# Patient Record
Sex: FEMALE | Race: WHITE | Employment: PART TIME | URBAN - METROPOLITAN AREA
[De-identification: names, ages, dates, MRNs, and addresses within clinical notes are randomized per-mention and may not be internally consistent; named-entity substitution may affect disease eponyms.]

---

## 2019-01-03 LAB
ANTIBODY SCREEN, EXTERNAL: NEGATIVE
CHLAMYDIA, EXTERNAL: NEGATIVE
GRBS, EXTERNAL: POSITIVE
HBSAG, EXTERNAL: NEGATIVE
HIV, EXTERNAL: NON REACTIVE
N. GONORRHEA, EXTERNAL: NEGATIVE
RUBELLA, EXTERNAL: NORMAL
T. PALLIDUM, EXTERNAL: NEGATIVE
TYPE, ABO & RH, EXTERNAL: NORMAL
URINALYSIS, EXTERNAL: NEGATIVE
URINALYSIS, EXTERNAL: NORMAL

## 2019-07-31 ENCOUNTER — ANESTHESIA (OUTPATIENT)
Dept: LABOR AND DELIVERY | Age: 34
End: 2019-07-31
Payer: COMMERCIAL

## 2019-07-31 ENCOUNTER — HOSPITAL ENCOUNTER (INPATIENT)
Age: 34
LOS: 2 days | Discharge: HOME | End: 2019-08-02
Attending: OBSTETRICS & GYNECOLOGY | Admitting: OBSTETRICS & GYNECOLOGY
Payer: COMMERCIAL

## 2019-07-31 ENCOUNTER — ANESTHESIA EVENT (OUTPATIENT)
Dept: LABOR AND DELIVERY | Age: 34
End: 2019-07-31
Payer: COMMERCIAL

## 2019-07-31 PROBLEM — Z37.9 NORMAL LABOR: Status: ACTIVE | Noted: 2019-07-31

## 2019-07-31 LAB
ERYTHROCYTE [DISTWIDTH] IN BLOOD BY AUTOMATED COUNT: 14.7 % (ref 11.5–14.5)
HCT VFR BLD AUTO: 32.7 % (ref 35–47)
HGB BLD-MCNC: 10.3 G/DL (ref 11.5–16)
MCH RBC QN AUTO: 27.5 PG (ref 26–34)
MCHC RBC AUTO-ENTMCNC: 31.5 G/DL (ref 30–36.5)
MCV RBC AUTO: 87.4 FL (ref 80–99)
NRBC # BLD: 0 K/UL (ref 0–0.01)
NRBC BLD-RTO: 0 PER 100 WBC
PLATELET # BLD AUTO: 467 K/UL (ref 150–400)
PMV BLD AUTO: 9.3 FL (ref 8.9–12.9)
RBC # BLD AUTO: 3.74 M/UL (ref 3.8–5.2)
WBC # BLD AUTO: 15.6 K/UL (ref 3.6–11)

## 2019-07-31 PROCEDURE — 74011000258 HC RX REV CODE- 258: Performed by: OBSTETRICS & GYNECOLOGY

## 2019-07-31 PROCEDURE — 0UQGXZZ REPAIR VAGINA, EXTERNAL APPROACH: ICD-10-PCS | Performed by: OBSTETRICS & GYNECOLOGY

## 2019-07-31 PROCEDURE — 65410000002 HC RM PRIVATE OB

## 2019-07-31 PROCEDURE — 74011000250 HC RX REV CODE- 250

## 2019-07-31 PROCEDURE — 74011250636 HC RX REV CODE- 250/636

## 2019-07-31 PROCEDURE — 77030031139 HC SUT VCRL2 J&J -A

## 2019-07-31 PROCEDURE — 88305 TISSUE EXAM BY PATHOLOGIST: CPT

## 2019-07-31 PROCEDURE — 74011250637 HC RX REV CODE- 250/637: Performed by: OBSTETRICS & GYNECOLOGY

## 2019-07-31 PROCEDURE — 74011250636 HC RX REV CODE- 250/636: Performed by: ANESTHESIOLOGY

## 2019-07-31 PROCEDURE — 77030034850

## 2019-07-31 PROCEDURE — 77030014125 HC TY EPDRL BBMI -B: Performed by: ANESTHESIOLOGY

## 2019-07-31 PROCEDURE — 59025 FETAL NON-STRESS TEST: CPT

## 2019-07-31 PROCEDURE — 75410000003 HC RECOV DEL/VAG/CSECN EA 0.5 HR

## 2019-07-31 PROCEDURE — A4300 CATH IMPL VASC ACCESS PORTAL: HCPCS

## 2019-07-31 PROCEDURE — 74011000250 HC RX REV CODE- 250: Performed by: ANESTHESIOLOGY

## 2019-07-31 PROCEDURE — 75410000002 HC LABOR FEE PER 1 HR

## 2019-07-31 PROCEDURE — 76060000078 HC EPIDURAL ANESTHESIA

## 2019-07-31 PROCEDURE — 36415 COLL VENOUS BLD VENIPUNCTURE: CPT

## 2019-07-31 PROCEDURE — 74011250636 HC RX REV CODE- 250/636: Performed by: OBSTETRICS & GYNECOLOGY

## 2019-07-31 PROCEDURE — 10907ZC DRAINAGE OF AMNIOTIC FLUID, THERAPEUTIC FROM PRODUCTS OF CONCEPTION, VIA NATURAL OR ARTIFICIAL OPENING: ICD-10-PCS | Performed by: OBSTETRICS & GYNECOLOGY

## 2019-07-31 PROCEDURE — 75410000000 HC DELIVERY VAGINAL/SINGLE

## 2019-07-31 PROCEDURE — 85027 COMPLETE CBC AUTOMATED: CPT

## 2019-07-31 RX ORDER — BUPIVACAINE HYDROCHLORIDE 2.5 MG/ML
INJECTION, SOLUTION EPIDURAL; INFILTRATION; INTRACAUDAL
Status: DISCONTINUED
Start: 2019-07-31 | End: 2019-07-31 | Stop reason: WASHOUT

## 2019-07-31 RX ORDER — OXYTOCIN/RINGER'S LACTATE 20/1000 ML
PLASTIC BAG, INJECTION (ML) INTRAVENOUS
Status: COMPLETED
Start: 2019-07-31 | End: 2019-07-31

## 2019-07-31 RX ORDER — FENTANYL CITRATE 50 UG/ML
INJECTION, SOLUTION INTRAMUSCULAR; INTRAVENOUS AS NEEDED
Status: DISCONTINUED | OUTPATIENT
Start: 2019-07-31 | End: 2019-07-31 | Stop reason: HOSPADM

## 2019-07-31 RX ORDER — EPHEDRINE SULFATE/0.9% NACL/PF 50 MG/5 ML
10 SYRINGE (ML) INTRAVENOUS
Status: ACTIVE | OUTPATIENT
Start: 2019-07-31 | End: 2019-07-31

## 2019-07-31 RX ORDER — FENTANYL CITRATE 50 UG/ML
100 INJECTION, SOLUTION INTRAMUSCULAR; INTRAVENOUS ONCE
Status: ACTIVE | OUTPATIENT
Start: 2019-07-31 | End: 2019-08-01

## 2019-07-31 RX ORDER — FACIAL-BODY WIPES
10 EACH TOPICAL DAILY PRN
Status: DISCONTINUED | OUTPATIENT
Start: 2019-07-31 | End: 2019-08-02 | Stop reason: HOSPADM

## 2019-07-31 RX ORDER — DIPHENHYDRAMINE HCL 25 MG
25 CAPSULE ORAL
Status: DISCONTINUED | OUTPATIENT
Start: 2019-07-31 | End: 2019-08-02 | Stop reason: HOSPADM

## 2019-07-31 RX ORDER — NALOXONE HYDROCHLORIDE 0.4 MG/ML
0.4 INJECTION, SOLUTION INTRAMUSCULAR; INTRAVENOUS; SUBCUTANEOUS AS NEEDED
Status: DISCONTINUED | OUTPATIENT
Start: 2019-07-31 | End: 2019-08-02 | Stop reason: HOSPADM

## 2019-07-31 RX ORDER — SODIUM CHLORIDE 0.9 % (FLUSH) 0.9 %
5-40 SYRINGE (ML) INJECTION AS NEEDED
Status: DISCONTINUED | OUTPATIENT
Start: 2019-07-31 | End: 2019-08-02 | Stop reason: HOSPADM

## 2019-07-31 RX ORDER — FENTANYL/BUPIVACAINE/NS/PF 2-1250MCG
PREFILLED PUMP RESERVOIR EPIDURAL
Status: COMPLETED
Start: 2019-07-31 | End: 2019-07-31

## 2019-07-31 RX ORDER — PENICILLIN G POTASSIUM 5000000 [IU]/1
INJECTION, POWDER, FOR SOLUTION INTRAMUSCULAR; INTRAVENOUS
Status: COMPLETED
Start: 2019-07-31 | End: 2019-07-31

## 2019-07-31 RX ORDER — SIMETHICONE 80 MG
80 TABLET,CHEWABLE ORAL
Status: DISCONTINUED | OUTPATIENT
Start: 2019-07-31 | End: 2019-08-02 | Stop reason: HOSPADM

## 2019-07-31 RX ORDER — BUPIVACAINE HYDROCHLORIDE AND EPINEPHRINE 2.5; 5 MG/ML; UG/ML
INJECTION, SOLUTION EPIDURAL; INFILTRATION; INTRACAUDAL; PERINEURAL AS NEEDED
Status: DISCONTINUED | OUTPATIENT
Start: 2019-07-31 | End: 2019-07-31 | Stop reason: HOSPADM

## 2019-07-31 RX ORDER — TRIPROLIDINE/PSEUDOEPHEDRINE 2.5MG-60MG
600 TABLET ORAL
Status: DISCONTINUED | OUTPATIENT
Start: 2019-07-31 | End: 2019-08-02 | Stop reason: HOSPADM

## 2019-07-31 RX ORDER — ONDANSETRON 4 MG/1
4 TABLET, FILM COATED ORAL
COMMUNITY
End: 2019-08-02

## 2019-07-31 RX ORDER — BUPIVACAINE HYDROCHLORIDE AND EPINEPHRINE 2.5; 5 MG/ML; UG/ML
INJECTION, SOLUTION EPIDURAL; INFILTRATION; INTRACAUDAL; PERINEURAL
Status: COMPLETED
Start: 2019-07-31 | End: 2019-07-31

## 2019-07-31 RX ORDER — MINERAL OIL
OIL (ML) MISCELLANEOUS AS NEEDED
Status: DISCONTINUED | OUTPATIENT
Start: 2019-07-31 | End: 2019-08-02 | Stop reason: HOSPADM

## 2019-07-31 RX ORDER — SODIUM CHLORIDE 0.9 % (FLUSH) 0.9 %
5-40 SYRINGE (ML) INJECTION EVERY 8 HOURS
Status: DISCONTINUED | OUTPATIENT
Start: 2019-07-31 | End: 2019-08-02 | Stop reason: HOSPADM

## 2019-07-31 RX ORDER — FENTANYL CITRATE 50 UG/ML
INJECTION, SOLUTION INTRAMUSCULAR; INTRAVENOUS
Status: COMPLETED
Start: 2019-07-31 | End: 2019-07-31

## 2019-07-31 RX ORDER — OXYTOCIN/RINGER'S LACTATE 20/1000 ML
125-500 PLASTIC BAG, INJECTION (ML) INTRAVENOUS ONCE
Status: COMPLETED | OUTPATIENT
Start: 2019-07-31 | End: 2019-07-31

## 2019-07-31 RX ORDER — FENTANYL/BUPIVACAINE/NS/PF 2-1250MCG
1-16 PREFILLED PUMP RESERVOIR EPIDURAL CONTINUOUS
Status: DISCONTINUED | OUTPATIENT
Start: 2019-07-31 | End: 2019-08-01 | Stop reason: HOSPADM

## 2019-07-31 RX ORDER — HYDROCORTISONE ACETATE PRAMOXINE HCL 2.5; 1 G/100G; G/100G
CREAM TOPICAL AS NEEDED
Status: DISCONTINUED | OUTPATIENT
Start: 2019-07-31 | End: 2019-08-02 | Stop reason: HOSPADM

## 2019-07-31 RX ORDER — SODIUM CHLORIDE, SODIUM LACTATE, POTASSIUM CHLORIDE, CALCIUM CHLORIDE 600; 310; 30; 20 MG/100ML; MG/100ML; MG/100ML; MG/100ML
125 INJECTION, SOLUTION INTRAVENOUS CONTINUOUS
Status: DISCONTINUED | OUTPATIENT
Start: 2019-07-31 | End: 2019-08-02 | Stop reason: HOSPADM

## 2019-07-31 RX ORDER — ONDANSETRON 4 MG/1
4 TABLET, ORALLY DISINTEGRATING ORAL
Status: ACTIVE | OUTPATIENT
Start: 2019-07-31 | End: 2019-08-01

## 2019-07-31 RX ADMIN — BUPIVACAINE HYDROCHLORIDE AND EPINEPHRINE 4 ML: 2.5; 5 INJECTION, SOLUTION EPIDURAL; INFILTRATION; INTRACAUDAL; PERINEURAL at 17:35

## 2019-07-31 RX ADMIN — SODIUM CHLORIDE, SODIUM LACTATE, POTASSIUM CHLORIDE, AND CALCIUM CHLORIDE 125 ML/HR: 600; 310; 30; 20 INJECTION, SOLUTION INTRAVENOUS at 16:31

## 2019-07-31 RX ADMIN — SODIUM CHLORIDE 5 MILLION UNITS: 900 INJECTION, SOLUTION INTRAVENOUS at 15:57

## 2019-07-31 RX ADMIN — SODIUM CHLORIDE, SODIUM LACTATE, POTASSIUM CHLORIDE, AND CALCIUM CHLORIDE 125 ML/HR: 600; 310; 30; 20 INJECTION, SOLUTION INTRAVENOUS at 15:30

## 2019-07-31 RX ADMIN — Medication 19980 MILLI-UNITS/HR: at 21:26

## 2019-07-31 RX ADMIN — FENTANYL CITRATE 100 MCG: 50 INJECTION, SOLUTION INTRAMUSCULAR; INTRAVENOUS at 17:35

## 2019-07-31 RX ADMIN — IBUPROFEN 600 MG: 100 SUSPENSION ORAL at 22:47

## 2019-07-31 RX ADMIN — BUPIVACAINE HYDROCHLORIDE AND EPINEPHRINE 2 ML: 2.5; 5 INJECTION, SOLUTION EPIDURAL; INFILTRATION; INTRACAUDAL; PERINEURAL at 17:33

## 2019-07-31 RX ADMIN — BUPIVACAINE HYDROCHLORIDE AND EPINEPHRINE 0.5 ML: 2.5; 5 INJECTION, SOLUTION EPIDURAL; INFILTRATION; INTRACAUDAL; PERINEURAL at 17:31

## 2019-07-31 RX ADMIN — Medication 12 ML/HR: at 17:55

## 2019-07-31 RX ADMIN — PENICILLIN G POTASSIUM 2.5 MILLION UNITS: 20000000 POWDER, FOR SOLUTION INTRAVENOUS at 19:30

## 2019-07-31 RX ADMIN — PENICILLIN G POTASSIUM 5 MILLION UNITS: 5000000 POWDER, FOR SOLUTION INTRAMUSCULAR; INTRAPLEURAL; INTRATHECAL; INTRAVENOUS at 16:00

## 2019-07-31 NOTE — ANESTHESIA PROCEDURE NOTES
CSE Block    Start time: 7/31/2019 5:17 PM  End time: 7/31/2019 5:37 PM  Performed by: Layla Stephens MD  Authorized by: Layla Stephens MD     Pre-Procedure  Indications: procedure for pain    preanesthetic checklist: patient identified, risks and benefits discussed, anesthesia consent, site marked, patient being monitored and timeout performed    Timeout Time: 17:16        Procedure:   Patient Position:  Seated  Prep Region:  Lumbar  Prep: chlorhexidine    Location:  L3-4    Epidural Needle:   Needle Type:  Tuohy  Needle Gauge:  18 G  Injection Technique:  Loss of resistance using air  Attempts:  2    Spinal Needle:   Needle Type:  Tuohy  Needle Gauge:  25 G    Catheter:   Catheter Type:  Flex-tip  Catheter Size:  19 G  Depth in Epidural Space (cm):  5  Events: no blood with aspiration, no cerebrospinal fluid with aspiration, no paresthesia, negative aspiration test and CSF confirmed    Test Dose:  Bupivacaine 0.25% w/ epi and negative    Assessment:   Catheter Secured:  Tegaderm and tape  Insertion:  Uncomplicated  Patient tolerance:  Patient tolerated the procedure well with no immediate complications

## 2019-07-31 NOTE — ANESTHESIA PREPROCEDURE EVALUATION
Anesthetic History   No history of anesthetic complications            Review of Systems / Medical History  Patient summary reviewed, nursing notes reviewed and pertinent labs reviewed    Pulmonary  Within defined limits                 Neuro/Psych   Within defined limits           Cardiovascular      Valvular problems/murmurs: mitral insufficiency            Exercise tolerance: >4 METS  Comments: MVP   GI/Hepatic/Renal  Within defined limits              Endo/Other  Within defined limits           Other Findings              Physical Exam    Airway  Mallampati: I  TM Distance: 4 - 6 cm  Neck ROM: normal range of motion   Mouth opening: Normal     Cardiovascular          Murmur: Grade 1, Mitral area     Dental         Pulmonary  Breath sounds clear to auscultation               Abdominal  GI exam deferred       Other Findings            Anesthetic Plan    ASA: 2  Anesthesia type: CSE            Anesthetic plan and risks discussed with: Patient

## 2019-07-31 NOTE — PROGRESS NOTES
7:15 PM  Bedside shift change report given to Vipin Steward RN (oncoming nurse) by Mica Ortega RN (offgoing nurse). Report included the following information SBAR, Kardex, Intake/Output, MAR and Recent Results. 8:12 PM  Dr Nikko Eldridge in with pt. Strip reviewed. SVE performed. Pt complete. Will prep for delivery. 8:21 PM   of live female infant. Nuchal x 1, reduced by Dr Nikko Eldridge.

## 2019-07-31 NOTE — ROUTINE PROCESS
1630- IVB for Epidural per request. 
1700- Anesthesia notified. 26- Dr Enriquez Favor in. Procedure explained to pt and consent verified. Pt verbalized understanding. Time out done. Positioned for procedure. 1725- Procedure completed pt tolerated. 1910- Bedside report to Ganesh Eubanks RN given. Care turned over at this time.

## 2019-07-31 NOTE — LACTATION NOTE
.Discussed with mother her plan for feeding. Reviewed the benefits of exclusive breast milk feeding during the hospital stay. Informed mother of the risks of using formula to supplement in the first few days of life as well as the benefits of successful breast milk feeding; referred mother to the handout in her admission packet related to these topics. Mother acknowledges understanding of information reviewed and states that it is her plan to breast milk and formula feed her infant. Will support her choice and offer additional information as needed.

## 2019-07-31 NOTE — H&P
History & Physical    Name: Ping Cuba MRN: [id number]  SSN: [id number]    YOB: 1985  Age: 26 y.o. Sex: female        Subjective:     Estimated Date of Delivery: 19  OB History    Para Term  AB Living   2 1 1     1   SAB TAB Ectopic Molar Multiple Live Births             1      # Outcome Date GA Lbr Addison/2nd Weight Sex Delivery Anes PTL Lv   2 Current            1 Term 14 37w5d  3.08 kg Ashleigh Levo       Ms. Kaylee Ventura is admitted with pregnancy at 38w1d for active labor. Prenatal course was normal. Please see prenatal records for details. Past Medical History:   Diagnosis Date    Heart abnormality     MVP     History reviewed. No pertinent surgical history. Social History     Occupational History    Not on file   Tobacco Use    Smoking status: Never Smoker    Smokeless tobacco: Never Used   Substance and Sexual Activity    Alcohol use: Not Currently    Drug use: Not on file    Sexual activity: Yes     Partners: Male     Birth control/protection: None     Family History   Problem Relation Age of Onset    Elevated Lipids Mother     Migraines Mother     Arthritis-osteo Father     Diabetes Father     Elevated Lipids Father     Migraines Father     Hypertension Father     Psychiatric Disorder Father     Migraines Sister      No Known Allergies  Prior to Admission medications    Medication Sig Start Date End Date Taking? Authorizing Provider   ondansetron hcl (ZOFRAN) 4 mg tablet Take 4 mg by mouth every eight (8) hours as needed for Nausea. Yes Provider, Historical        Review of Systems    Objective:     Vitals:  Vitals:    19 1609 19 1615 19 1634 19 1636   BP:    119/72   Pulse:    97   Resp:       Temp:       SpO2: 100%  100%    Weight:  69.4 kg (153 lb)     Height:  5' 5\" (1.651 m)          Physical Exam   Constitutional: She appears well-developed. HENT:   Head: Normocephalic.    Neck: Normal range of motion. Cardiovascular: Normal rate. Pulmonary/Chest: Effort normal.   Musculoskeletal: Normal range of motion. Neurological: She is alert. Cervical Exam: 4 cm dilated  Per Dr. Dee Coelho  Uterine Activity: Frequency: Every 4 minutes   Membranes: Intact  Fetal Heart Rate: Reactive     Prenatal Labs:   Lab Results   Component Value Date/Time    Rubella, External IMMUNE 5.52 01/03/2019    GrBStrep, External neg 04/17/2014    HBsAg, External NEGATIVE 01/03/2019    HIV, External NON REACTIVE 01/03/2019    RPR, External non reactive 10/11/2013    Gonorrhea, External NEGATIVE 01/03/2019    Chlamydia, External NEGATIVE 01/03/2019    ABO,Rh A NEGATIVE 01/03/2019          Impression/Plan:     Active Problems:    Normal labor (7/31/2019)         Plan: Admit for labor. Group B Strep was positive, will treat prophylactically with penicillin.   Will bolus for epidural.

## 2019-08-01 PROCEDURE — 74011250637 HC RX REV CODE- 250/637: Performed by: OBSTETRICS & GYNECOLOGY

## 2019-08-01 PROCEDURE — 86900 BLOOD TYPING SEROLOGIC ABO: CPT

## 2019-08-01 PROCEDURE — 74011250636 HC RX REV CODE- 250/636: Performed by: OBSTETRICS & GYNECOLOGY

## 2019-08-01 PROCEDURE — 65410000002 HC RM PRIVATE OB

## 2019-08-01 PROCEDURE — 36415 COLL VENOUS BLD VENIPUNCTURE: CPT

## 2019-08-01 PROCEDURE — 85461 HEMOGLOBIN FETAL: CPT

## 2019-08-01 RX ORDER — SERTRALINE HYDROCHLORIDE 50 MG/1
25 TABLET, FILM COATED ORAL DAILY
Status: DISCONTINUED | OUTPATIENT
Start: 2019-08-01 | End: 2019-08-02 | Stop reason: HOSPADM

## 2019-08-01 RX ADMIN — IBUPROFEN 600 MG: 100 SUSPENSION ORAL at 12:27

## 2019-08-01 RX ADMIN — IBUPROFEN 600 MG: 100 SUSPENSION ORAL at 21:11

## 2019-08-01 RX ADMIN — IBUPROFEN 600 MG: 100 SUSPENSION ORAL at 06:43

## 2019-08-01 RX ADMIN — ACETAMINOPHEN 650 MG: 160 SUSPENSION ORAL at 10:12

## 2019-08-01 RX ADMIN — HUMAN RHO(D) IMMUNE GLOBULIN 0.3 MG: 300 INJECTION, SOLUTION INTRAMUSCULAR at 21:15

## 2019-08-01 NOTE — PROGRESS NOTES
Report received from [address] [place] [address], care assumed. Pt has no needs at this time. 1000 mother concerned about her  not being on birth certificate, insurance etc due to her  not being babys father. Offered to place a case management consult to assist her in additional support if needed. Pt also desired to be back on zoloft as she was concerned about depression with all that is going on with marriage, fob not involved and other fidelia health issues. Will speak to Dr Gerri Max about starting it back up. Post partum depression signs discussed with patient and opportunity for questions given. [id number] report to [facility]  Discussed possibility of using different medication other than zoloft while breastfeeding. No lactation consultant here today to recommend.

## 2019-08-01 NOTE — PROGRESS NOTES
11:00 PM  Bedside shift change report given to Tabatha Hobson RN (oncoming nurse) by Xavier Araujo RN (offgoing nurse). Report included the following information SBAR, Kardex, Intake/Output, MAR and Recent Results.

## 2019-08-01 NOTE — PROGRESS NOTES
Post-Partum Day Number 1 Progress Note    Ivanna Bermudez     Assessment: Doing well, post partum day 1 from  p term labor. Complicated social situation and history of depression, desiring to see case management and restart home zoloft. Plan:  1. Continue routine postpartum and perineal care as well as maternal education. 2. Restart zoloft 25 mg. Will schedule 1 wk mood check. Case management to see patient. Information for the patient's :  Marlene Gomes [[id number]]   Vaginal, Spontaneous     Patient doing well without significant complaint. Voiding without difficulty, normal lochia. Ambulating, tolerating a diet. She is feeling stressed that her  can't be on the birth certificate (not FOB). Vitals:  Visit Vitals  /62 (BP 1 Location: Right arm, BP Patient Position: At rest)   Pulse 80   Temp 97.3 °F (36.3 °C)   Resp 17   Ht 5' 5\" (1.651 m)   Wt 69.4 kg (153 lb)   SpO2 97%   Breastfeeding? Unknown   BMI 25.46 kg/m²     Temp (24hrs), Av °F (36.7 °C), Min:97.3 °F (36.3 °C), Max:98.4 °F (36.9 °C)        Exam:   Patient without distress. Abdomen soft, fundus firm, nontender                Perineum with normal lochia noted. Lower extremities are negative for swelling, cords or tenderness.     Labs:       Recent Results (from the past 24 hour(s))   CBC W/O DIFF    Collection Time: 19  3:30 PM   Result Value Ref Range    WBC 15.6 (H) 3.6 - 11.0 K/uL    RBC 3.74 (L) 3.80 - 5.20 M/uL    HGB 10.3 (L) 11.5 - 16.0 g/dL    HCT 32.7 (L) 35.0 - 47.0 %    MCV 87.4 80.0 - 99.0 FL    MCH 27.5 26.0 - 34.0 PG    MCHC 31.5 30.0 - 36.5 g/dL    RDW 14.7 (H) 11.5 - 14.5 %    PLATELET 754 (H) 045 - 400 K/uL    MPV 9.3 8.9 - 12.9 FL    NRBC 0.0 0  WBC    ABSOLUTE NRBC 0.00 0.00 - 0.01 K/uL   RH IMMUNE GLOBULIN EVAL-LAB ORDER    Collection Time: 19  3:31 AM   Result Value Ref Range    ABO/Rh(D) A NEGATIVE     Fetal screen NEG     WEAK D NEG Unit number [id number]     Blood component type RH IMMUNE GLOBULIN     Unit division 00     Status of unit ALLOCATED

## 2019-08-01 NOTE — L&D DELIVERY NOTE
Delivery Summary  Patient: Bashir Recio             Circumcision:   NA-female  Additional Delivery Comments - Pt presented in active labor. PCN given for gbs prophylaxis. After second dose, arom performed and quickly progressed to c/c/+3. Pushed to deliver a viable female infant over intact perineum. Cord was clamped and cut and baby handed to nursing per mother's request.  Placenta delivered spontaneously and intact and was normal in appearance. Vaginal laceration just inside introitus to the right was reapproximated with 3-0 vicryl. An irregular mole was noted on her left buttock and she stated it catches on her underwear so it was removed sharply and the defect closed with one interrupted suture of 3-0 vicryl. It was sent to pathology. Information for the patient's :  Lachelle Tyson [[id number]]       Labor Events:    Labor: No    Steroids: None   Cervical Ripening Date/Time:       Cervical Ripening Type: None   Antibiotics During Labor: Yes   Rupture Identifier:      Rupture Date/Time: 2019 8:00 PM   Rupture Type: AROM   Amniotic Fluid Volume:      Amniotic Fluid Description: Clear    Amniotic Fluid Odor: None    Induction:         Induction Date/Time:        Indications for Induction:      Augmentation:     Augmentation Date/Time:      Indications for Augmentation:     Labor complications:          Additional complications:        Delivery Events:  Indications For Episiotomy:     Episiotomy:     Perineal Laceration(s):     Repaired:     Periurethral Laceration Location:      Repaired:     Labial Laceration Location:     Repaired:     Sulcal Laceration Location:     Repaired:     Vaginal Laceration Location:     Repaired:     Cervical Laceration Location:     Repaired:     Repair Suture:     Number of Repair Packets:     Estimated Blood Loss (ml):  ml     Delivery Date:     Delivery Time:   Delivery Type: Vaginal, Spontaneous  Sex:      Gestational Age: 43w4d Delivery Clinician:  Hugo Ugalde  Living Status: Living   Delivery Location: L&D 3168          APGARS  One minute Five minutes Ten minutes   Skin color: 0   1        Heart rate: 2   2        Grimace: 2   2        Muscle tone: 2   2        Breathin   2        Totals: 8   9            Presentation: Vertex    Position:        Resuscitation Method:        Meconium Stained:        Cord Information:    Complications: Nuchal Cord Without Compressions  Cord around:    Delayed cord clamping? Cord clamped date/time:   Disposition of Cord Blood:      Blood Gases Sent?:      Placenta:  Date/Time: 2019  8:26 PM  Removal: Spontaneous      Appearance: Intact     [place] Measurements:  Birth Weight:        Birth Length:        Head Circumference:        Chest Circumference:       Abdominal Girth:       Other Providers:   LASHAWN UGALDE, Obstetrician;Primary Nurse;Primary  Nurse;Nicu Nurse;Neonatologist;Anesthesiologist;Crna;Nurse Practitioner;Midwife;Nursery Nurse           Cord pH:  none    Episiotomy:     Laceration(s):       Estimated Blood Loss (ml):     Labor Events  Method:        Augmentation:     Cervical Ripening:       None        Hospital Problems  Never Reviewed          Codes Class Noted POA    Normal labor ICD-10-CM: [de-identified], Z37.9  ICD-9-CM: 634  2019 Unknown              Operative Vaginal Delivery - none    Group B Strep:   Lab Results   Component Value Date/Time    GrBStrep, External Positive 2019     Information for the patient's :  Sadafdallas hSanksard [[id number]]   No results found for: ABORH, PCTABR, PCTDIG, BILI, ABORHEXT, ABORH    No results found for: APH, APCO2, APO2, AHCO3, ABEC, ABDC, O2ST, EPHV, PCO2V, PO2V, HCO3V, EBEV, EBDV, SITE, RSCOM

## 2019-08-02 VITALS
BODY MASS INDEX: 25.49 KG/M2 | HEIGHT: 65 IN | HEART RATE: 59 BPM | OXYGEN SATURATION: 97 % | SYSTOLIC BLOOD PRESSURE: 115 MMHG | WEIGHT: 153 LBS | TEMPERATURE: 98 F | DIASTOLIC BLOOD PRESSURE: 70 MMHG | RESPIRATION RATE: 16 BRPM

## 2019-08-02 LAB
ABO + RH BLD: NORMAL
BLD PROD TYP BPU: NORMAL
BPU ID: NORMAL
FETAL SCREEN,FMHS: NORMAL
STATUS OF UNIT,%ST: NORMAL
UNIT DIVISION, %UDIV: 0
WEAK D AG RBC QL: NORMAL

## 2019-08-02 PROCEDURE — 74011250637 HC RX REV CODE- 250/637: Performed by: OBSTETRICS & GYNECOLOGY

## 2019-08-02 RX ORDER — TRIPROLIDINE/PSEUDOEPHEDRINE 2.5MG-60MG
600 TABLET ORAL
Qty: 473 ML | Refills: 1 | Status: SHIPPED | OUTPATIENT
Start: 2019-08-02

## 2019-08-02 RX ORDER — SERTRALINE HYDROCHLORIDE 25 MG/1
25 TABLET, FILM COATED ORAL DAILY
Qty: 30 TAB | Refills: 2 | Status: SHIPPED | OUTPATIENT
Start: 2019-08-03

## 2019-08-02 RX ADMIN — IBUPROFEN 600 MG: 100 SUSPENSION ORAL at 05:40

## 2019-08-02 NOTE — PROGRESS NOTES
Bedside and Verbal shift change report given to A. Meryle Simpson, RN  (oncoming nurse) by MAKENZIE Rabago (offgoing nurse). Report included the following information SBAR, Kardex, Procedure Summary, Intake/Output, MAR and Recent Results.

## 2019-08-02 NOTE — ROUTINE PROCESS
2300 Bedside shift change report given to MAKENZIE Hardy RN (oncoming nurse) by Shay Quigley. Hattie Wood [facility] (offgoing nurse). Report included the following information SBAR, Kardex, Intake/Output and MAR.

## 2019-08-02 NOTE — PROGRESS NOTES
Patient discharge prescriptions & instructions given. Verbalized understanding. All questions answered. No distress noted. Signed copy of discharge instructions on paper chart. Pt instructed to follow up with OB in 1 week for postpartum follow up appointment, sooner if needed.

## 2019-08-02 NOTE — DISCHARGE SUMMARY
Obstetrical Discharge Summary     Name: Matt Mitchell MRN: [id number]  SSN: [id number]    YOB: 1985  Age: 26 y.o. Sex: female      Admit Date: 2019    Discharge Date: 2019     Admitting Physician: Tom Kleley MD     Attending Physician:  Lenora Meckel, MD     Admission Diagnoses: Normal labor [O80, Z37.9]    Discharge Diagnoses:   Information for the patient's :  Sallie Mckenzie [[id number]]   Delivery of a 3.195 kg female infant via Vaginal, Spontaneous on 2019 at 8:21 PM  by Tom Kelley. Apgars were 8  and 9 . Additional Diagnoses:   Hospital Problems  Never Reviewed          Codes Class Noted POA    Normal labor ICD-10-CM: [de-identified], Z37.9  ICD-9-CM: 159  2019 Unknown             Lab Results   Component Value Date/Time    Rubella, External IMMUNE 5.52 2019    GrBStrep, External Positive 2019       Hospital Course: Normal hospital course following the delivery. Disposition at Discharge: Home or self care    Discharged Condition: Stable    Patient Instructions:   Current Discharge Medication List      START taking these medications    Details   ibuprofen (ADVIL;MOTRIN) 100 mg/5 mL suspension Take 30 mL by mouth every six (6) hours as needed (pain). Indications: pain  Qty: 473 mL, Refills: 1      sertraline (ZOLOFT) 25 mg tablet Take 1 Tab by mouth daily. Qty: 30 Tab, Refills: 2         STOP taking these medications       ondansetron hcl (ZOFRAN) 4 mg tablet Comments:   Reason for Stopping:               Reference my discharge instructions. Follow-up Appointments   Procedures    FOLLOW UP VISIT Appointment in: One Week     Standing Status:   Standing     Number of Occurrences:   1     Order Specific Question:   Appointment in     Answer: One Week    FOLLOW UP VISIT Appointment in: 6 Weeks Post partum follow up with OB provider in 6 weeks. Post partum follow up with OB provider in 6 weeks.      Standing Status:   Standing Number of Occurrences:   1     Standing Expiration Date:   8/3/2019     Order Specific Question:   Appointment in     Answer:   6 Weeks        Signed By:  Feliciano Avalos MD     August 2, 2019

## 2019-08-02 NOTE — DISCHARGE INSTRUCTIONS
Vaginal Childbirth: What To Expect At Home    Your Recovery: Your body will slowly heal in the next few weeks. It is easy to get too tired and overwhelmed during the first weeks after your baby is born. Changes in your hormones can shift your mood without warning. You may find it hard to meet the extra demands on your energy and time. Take it easy on yourself. Follow-up care is a key part of your treatment and safety. Be sure to make and go to all appointments, and call your doctor if you are having problems. It's also a good idea to know your test results and keep a list of the medicines you take. How can you care for yourself at home? Vaginal bleeding and cramps  · After delivery, you will have a bloody discharge from the vagina. This will turn pink within a week and then white or yellow after about 10 days. It may last for 2 to 4 weeks or longer, until the uterus has healed. Use pads instead of tampons until you stop bleeding. · Do not worry if you pass some blood clots, as long as they are smaller than a golf ball. If you have a tear or stitches in your vaginal area, change the pad at least every 4 hours to prevent soreness and infection. · You may have cramps for the first few days after childbirth. These are normal and occur as the uterus shrinks to normal size. Take an over-the-counter pain medicine, such as acetaminophen (Tylenol), ibuprofen (Advil, Motrin), or naproxen (Aleve), for cramps. Read and follow all instructions on the label. Do not take aspirin, because it can cause more bleeding. Do not take acetaminophen (Tylenol) and other acetaminophen containing medications (i.e. Percocet) at the same time. Breast fullness  · Your breasts may overfill (engorge) in the first few days after delivery. To help milk flow and to relieve pain, warm your breasts in the shower or by using warm, moist towels before nursing.   · If you are not nursing, do not put warmth on your breasts or touch your breasts. Wear a tight bra or sports bra and use ice until the fullness goes away. This usually takes 2 to 3 days. · Put ice or a cold pack on your breast after nursing to reduce swelling and pain. Put a thin cloth between the ice and your skin. Activity  · Eat a balanced diet. Do not try to lose weight by cutting calories. Keep taking your prenatal vitamins, or take a multivitamin. · Get as much rest as you can. Try to take naps when your baby sleeps during the day. · Get some exercise every day. But do not do any heavy exercise until your doctor says it is okay. · Wait until you are healed (about 4 to 6 weeks) before you have sexual intercourse. Your doctor will tell you when it is okay to have sex. · Talk to your doctor about birth control. You can get pregnant even before your period returns. Also, you can get pregnant while you are breast-feeding. Mental Health  · Many women get the \"baby blues\" during the first few days after childbirth. You may lose sleep, feel irritable, and cry easily. You may feel happy one minute and sad the next. Hormone changes are one cause of these emotional changes. Also, the demands of a new baby, along with visits from relatives or other family needs, add to a mother's stress. The \"baby blues\" often peak around the fourth day. Then they ease up in less than 2 weeks. · If your moodiness or anxiety lasts for more than 2 weeks, or if you feel like life is not worth living, you may have postpartum depression. This is different for each mother. Some mothers with serious depression may worry intensely about their infant's well-being. Others may feel distant from their child. Some mothers might even feel that they might harm their baby. A mother may have signs of paranoia, wondering if someone is watching her. · With all the changes in your life, you may not know if you are depressed.  Pregnancy sometimes causes changes in how you feel that are similar to the symptoms of depression. · Symptoms of depression include:  · Feeling sad or hopeless and losing interest in daily activities. These are the most common symptoms of depression. · Sleeping too much or not enough. · Feeling tired. You may feel as if you have no energy. · Eating too much or too little. · [employer] ([employer]) offers a Warm line; Chat with the Expert phone sessions; Information and Articles about Pregnancy and Postpartum Mood Disorders; Comprehensive List of Free Support Groups; Knowledgeable local coordinators who will offer support, information, and resources; Guide to Resources on [place]; Calendar of events in the  mood disorders community; Latest News and Research; and [facility] [address] for [employer]. Remember - You are not alone; You are not to blame; With help, you will be well. 5-161-312-PPD(8773). WWW. POSTPARTUM. NET   · Writing or talking about death, such as writing suicide notes or talking about guns, knives, or pills. Keep the numbers for these national suicide hotlines: 6-027-585-TALK (3-349.984.2186) and 7-938-IXIHITH (0-900.546.6438). If you or someone you know talks about suicide or feeling hopeless, get help right away. Constipation and Hemorrhoids  · Drink plenty of fluids, enough so that your urine is light yellow or clear like water. If you have kidney, heart, or liver disease and have to limit fluids, talk with your doctor before you increase the amount of fluids you drink. · Eat plenty of fiber each day. Have a bran muffin or bran cereal for breakfast, and try eating a piece of fruit for a mid-afternoon snack. · For painful, itchy hemorrhoids, put ice or a cold pack on the area several times a day for 10 minutes at a time. Follow this by putting a warm compress on the area for another 10 to 20 minutes or by sitting in a shallow, warm bath. When should you call for help? Call 911 anytime you think you may need emergency care.  For example, call if:  · You are thinking of hurting yourself, your baby, or anyone else. · You passed out (lost consciousness). · You have symptoms of a blood clot in your lung (called a pulmonary embolism). These may include:    · Sudden chest pain. · Trouble breathing. · Coughing up blood. Call your doctor now or seek immediate medical care if:  · You have severe vaginal bleeding. · You are soaking through a pad each hour for 2 or more hours. · Your vaginal bleeding seems to be getting heavier or is still bright red 4 days after delivery. · You are dizzy or lightheaded, or you feel like you may faint. · You are vomiting or cannot keep fluids down. · You have a fever. · You have new or more belly pain. · You pass tissue (not just blood). · Your vaginal discharge smells bad. · Your belly feels tender or full and hard. · Your breasts are continuously painful or red. · You feel sad, anxious, or hopeless for more than a few days. · You have sudden, severe pain in your belly. · You have symptoms of a blood clot in your leg (called a deep vein thrombosis),          such as:  · Pain in your calf, back of the knee, thigh, or groin. · Redness and swelling in your leg or groin. · You have symptoms of preeclampsia, such as:  · Sudden swelling of your face, hands, or feet. · New vision problems (such as dimness or blurring). · A severe headache. · Your blood pressure is higher than it should be or rises suddenly. · You have new nausea or vomiting. Watch closely for changes in your health, and be sure to contact your doctor if you have any problems.

## 2019-08-02 NOTE — PROGRESS NOTES
Post-Partum Day Number 2 Progress Note    Charly Anand     Assessment: Doing well, post partum day 2. Restarted on Zoloft. Plan:   1. Discharge home today  2. Follow up in office in 1 week for mood check and in 6 weeks with Dr. Kassy Bourne (patient's request)  3. Post partum activity advised, diet as tolerated  4. Discharge Medications: ibuprofen, zoloft, and medications prior to admission    Information for the patient's :  Flores Choi [[id number]]   Vaginal, Spontaneous   Patient doing well without significant complaint. Voiding without difficulty, normal lochia. Questions regarding safety of Zoloft during breastfeeding reviewed. Vitals:  Visit Vitals  /70 (BP 1 Location: Left arm, BP Patient Position: Sitting)   Pulse (!) 59   Temp 98 °F (36.7 °C)   Resp 16   Ht 5' 5\" (1.651 m)   Wt 69.4 kg (153 lb)   SpO2 97%   Breastfeeding? Unknown   BMI 25.46 kg/m²     Temp (24hrs), Av °F (36.7 °C), Min:97.9 °F (36.6 °C), Max:98 °F (36.7 °C)      Exam:         Patient without distress. Abdomen soft, fundus firm, nontender                 Lower extremities are negative for swelling, cords or tenderness. Labs:     Lab Results   Component Value Date/Time    WBC 15.6 (H) 2019 03:30 PM    WBC 9.1 2015 10:03 PM    WBC 16.8 (H) 2014 06:25 PM    HGB 10.3 (L) 2019 03:30 PM    HGB 13.9 2015 10:03 PM    HGB 11.0 (L) 2014 06:25 PM    HCT 32.7 (L) 2019 03:30 PM    HCT 42.0 2015 10:03 PM    HCT 34.8 (L) 2014 06:25 PM    PLATELET 186 (H)  03:30 PM    PLATELET 694  10:03 PM    PLATELET 814 (H)  06:25 PM       No results found for this or any previous visit (from the past 24 hour(s)).

## 2019-08-02 NOTE — PROGRESS NOTES
CM noted consult indicating that mother needs assistance with plan for infant, her  is not father of infant, problems with insurance etc.    CM talked to RN and she was getting ready to DC pt. Not sure what assistance I could offer her other than contacting her local DSS if baby is in need of insurance. RN will let Pt know to contact DSS.      Valeriy [place]  Ext [phone]

## 2022-08-08 LAB
ANTIBODY SCREEN, EXTERNAL: NORMAL
HBSAG, EXTERNAL: NORMAL
HEPATITIS C AB,   EXT: NORMAL
HIV, EXTERNAL: NORMAL
RUBELLA, EXTERNAL: NORMAL
T. PALLIDUM, EXTERNAL: NORMAL
TYPE, ABO & RH, EXTERNAL: NORMAL

## 2022-09-14 LAB
CHLAMYDIA, EXTERNAL: NORMAL
N. GONORRHEA, EXTERNAL: NORMAL

## 2023-02-16 LAB — GRBS, EXTERNAL: NORMAL

## 2023-03-07 ENCOUNTER — ANESTHESIA (OUTPATIENT)
Dept: LABOR AND DELIVERY | Age: 38
End: 2023-03-07
Payer: COMMERCIAL

## 2023-03-07 ENCOUNTER — ANESTHESIA EVENT (OUTPATIENT)
Dept: LABOR AND DELIVERY | Age: 38
End: 2023-03-07
Payer: COMMERCIAL

## 2023-03-07 ENCOUNTER — HOSPITAL ENCOUNTER (INPATIENT)
Age: 38
LOS: 3 days | Discharge: HOME | End: 2023-03-10
Attending: OBSTETRICS & GYNECOLOGY | Admitting: OBSTETRICS & GYNECOLOGY
Payer: COMMERCIAL

## 2023-03-07 ENCOUNTER — HOSPITAL ENCOUNTER (EMERGENCY)
Age: 38
Discharge: HOME | End: 2023-03-07
Attending: OBSTETRICS & GYNECOLOGY | Admitting: OBSTETRICS & GYNECOLOGY
Payer: COMMERCIAL

## 2023-03-07 VITALS
TEMPERATURE: 98.2 F | HEIGHT: 65 IN | OXYGEN SATURATION: 100 % | SYSTOLIC BLOOD PRESSURE: 126 MMHG | HEART RATE: 108 BPM | BODY MASS INDEX: 26.66 KG/M2 | WEIGHT: 160 LBS | DIASTOLIC BLOOD PRESSURE: 82 MMHG | RESPIRATION RATE: 18 BRPM

## 2023-03-07 PROBLEM — O09.523 AMA (ADVANCED MATERNAL AGE) MULTIGRAVIDA 35+, THIRD TRIMESTER: Status: ACTIVE | Noted: 2023-03-07

## 2023-03-07 LAB
BASOPHILS # BLD: 0 K/UL (ref 0–0.1)
BASOPHILS NFR BLD: 0 % (ref 0–1)
DIFFERENTIAL METHOD BLD: ABNORMAL
EOSINOPHIL # BLD: 0 K/UL (ref 0–0.4)
EOSINOPHIL NFR BLD: 0 % (ref 0–7)
ERYTHROCYTE [DISTWIDTH] IN BLOOD BY AUTOMATED COUNT: 14.6 % (ref 11.5–14.5)
HCT VFR BLD AUTO: 36.4 % (ref 35–47)
HGB BLD-MCNC: 11.3 G/DL (ref 11.5–16)
IMM GRANULOCYTES # BLD AUTO: 0.1 K/UL (ref 0–0.04)
IMM GRANULOCYTES NFR BLD AUTO: 1 % (ref 0–0.5)
LYMPHOCYTES # BLD: 1.3 K/UL (ref 0.8–3.5)
LYMPHOCYTES NFR BLD: 8 % (ref 12–49)
MCH RBC QN AUTO: 28.3 PG (ref 26–34)
MCHC RBC AUTO-ENTMCNC: 31 G/DL (ref 30–36.5)
MCV RBC AUTO: 91 FL (ref 80–99)
MONOCYTES # BLD: 0.8 K/UL (ref 0–1)
MONOCYTES NFR BLD: 5 % (ref 5–13)
NEUTS SEG # BLD: 13.8 K/UL (ref 1.8–8)
NEUTS SEG NFR BLD: 86 % (ref 32–75)
NRBC # BLD: 0.03 K/UL (ref 0–0.01)
NRBC BLD-RTO: 0.2 PER 100 WBC
PLATELET # BLD AUTO: 478 K/UL (ref 150–400)
PMV BLD AUTO: 9.3 FL (ref 8.9–12.9)
RBC # BLD AUTO: 4 M/UL (ref 3.8–5.2)
WBC # BLD AUTO: 16 K/UL (ref 3.6–11)

## 2023-03-07 PROCEDURE — 59025 FETAL NON-STRESS TEST: CPT

## 2023-03-07 PROCEDURE — 80307 DRUG TEST PRSMV CHEM ANLYZR: CPT

## 2023-03-07 PROCEDURE — 74011000250 HC RX REV CODE- 250: Performed by: NURSE ANESTHETIST, CERTIFIED REGISTERED

## 2023-03-07 PROCEDURE — 77030014125 HC TY EPDRL BBMI -B: Performed by: ANESTHESIOLOGY

## 2023-03-07 PROCEDURE — 99283 EMERGENCY DEPT VISIT LOW MDM: CPT

## 2023-03-07 PROCEDURE — 86920 COMPATIBILITY TEST SPIN: CPT

## 2023-03-07 PROCEDURE — 65410000002 HC RM PRIVATE OB

## 2023-03-07 PROCEDURE — 36415 COLL VENOUS BLD VENIPUNCTURE: CPT

## 2023-03-07 PROCEDURE — 86921 COMPATIBILITY TEST INCUBATE: CPT

## 2023-03-07 PROCEDURE — 86644 CMV ANTIBODY: CPT

## 2023-03-07 PROCEDURE — 86900 BLOOD TYPING SEROLOGIC ABO: CPT

## 2023-03-07 PROCEDURE — 85025 COMPLETE CBC W/AUTO DIFF WBC: CPT

## 2023-03-07 PROCEDURE — 86870 RBC ANTIBODY IDENTIFICATION: CPT

## 2023-03-07 PROCEDURE — 86922 COMPATIBILITY TEST ANTIGLOB: CPT

## 2023-03-07 RX ORDER — OXYTOCIN/RINGER'S LACTATE 30/500 ML
87.3 PLASTIC BAG, INJECTION (ML) INTRAVENOUS AS NEEDED
Status: DISCONTINUED | OUTPATIENT
Start: 2023-03-07 | End: 2023-03-10 | Stop reason: HOSPADM

## 2023-03-07 RX ORDER — EPHEDRINE SULFATE/0.9% NACL/PF 50 MG/5 ML
10 SYRINGE (ML) INTRAVENOUS
Status: ACTIVE | OUTPATIENT
Start: 2023-03-07 | End: 2023-03-08

## 2023-03-07 RX ORDER — BUPIVACAINE HYDROCHLORIDE AND EPINEPHRINE 2.5; 5 MG/ML; UG/ML
INJECTION, SOLUTION EPIDURAL; INFILTRATION; INTRACAUDAL; PERINEURAL
Status: COMPLETED
Start: 2023-03-07 | End: 2023-03-07

## 2023-03-07 RX ORDER — SODIUM CHLORIDE 0.9 % (FLUSH) 0.9 %
5-40 SYRINGE (ML) INJECTION AS NEEDED
Status: DISCONTINUED | OUTPATIENT
Start: 2023-03-07 | End: 2023-03-08

## 2023-03-07 RX ORDER — NALOXONE HYDROCHLORIDE 0.4 MG/ML
0.4 INJECTION, SOLUTION INTRAMUSCULAR; INTRAVENOUS; SUBCUTANEOUS AS NEEDED
Status: DISCONTINUED | OUTPATIENT
Start: 2023-03-07 | End: 2023-03-10 | Stop reason: HOSPADM

## 2023-03-07 RX ORDER — ONDANSETRON 2 MG/ML
4 INJECTION INTRAMUSCULAR; INTRAVENOUS
Status: DISCONTINUED | OUTPATIENT
Start: 2023-03-07 | End: 2023-03-08

## 2023-03-07 RX ORDER — GUAIFENESIN 100 MG/5ML
81 LIQUID (ML) ORAL DAILY
COMMUNITY
End: 2023-03-10

## 2023-03-07 RX ORDER — SODIUM CHLORIDE 0.9 % (FLUSH) 0.9 %
5-40 SYRINGE (ML) INJECTION EVERY 8 HOURS
Status: DISCONTINUED | OUTPATIENT
Start: 2023-03-08 | End: 2023-03-08

## 2023-03-07 RX ORDER — FENTANYL/BUPIVACAINE/NS/PF 2-1250MCG
1-16 SYRINGE (ML) EPIDURAL CONTINUOUS
Status: DISCONTINUED | OUTPATIENT
Start: 2023-03-07 | End: 2023-03-10 | Stop reason: HOSPADM

## 2023-03-07 RX ORDER — NALOXONE HYDROCHLORIDE 0.4 MG/ML
0.4 INJECTION, SOLUTION INTRAMUSCULAR; INTRAVENOUS; SUBCUTANEOUS AS NEEDED
Status: DISCONTINUED | OUTPATIENT
Start: 2023-03-07 | End: 2023-03-08

## 2023-03-07 RX ORDER — PROMETHAZINE HYDROCHLORIDE 12.5 MG/1
TABLET ORAL
COMMUNITY

## 2023-03-07 RX ORDER — BUPIVACAINE HYDROCHLORIDE AND EPINEPHRINE 2.5; 5 MG/ML; UG/ML
INJECTION, SOLUTION EPIDURAL; INFILTRATION; INTRACAUDAL; PERINEURAL AS NEEDED
Status: DISCONTINUED | OUTPATIENT
Start: 2023-03-07 | End: 2023-03-08 | Stop reason: HOSPADM

## 2023-03-07 RX ORDER — LORATADINE 10 MG/1
10 TABLET ORAL
COMMUNITY

## 2023-03-07 RX ORDER — OXYTOCIN/RINGER'S LACTATE 30/500 ML
10 PLASTIC BAG, INJECTION (ML) INTRAVENOUS AS NEEDED
Status: DISCONTINUED | OUTPATIENT
Start: 2023-03-07 | End: 2023-03-10 | Stop reason: HOSPADM

## 2023-03-07 RX ORDER — SODIUM CHLORIDE, SODIUM LACTATE, POTASSIUM CHLORIDE, CALCIUM CHLORIDE 600; 310; 30; 20 MG/100ML; MG/100ML; MG/100ML; MG/100ML
125 INJECTION, SOLUTION INTRAVENOUS CONTINUOUS
Status: DISCONTINUED | OUTPATIENT
Start: 2023-03-08 | End: 2023-03-08

## 2023-03-07 RX ADMIN — BUPIVACAINE HYDROCHLORIDE AND EPINEPHRINE 7 ML: 2.5; 5 INJECTION, SOLUTION EPIDURAL; INFILTRATION; INTRACAUDAL; PERINEURAL at 23:17

## 2023-03-07 RX ADMIN — BUPIVACAINE HYDROCHLORIDE AND EPINEPHRINE 3 ML: 2.5; 5 INJECTION, SOLUTION EPIDURAL; INFILTRATION; INTRACAUDAL; PERINEURAL at 23:13

## 2023-03-07 RX ADMIN — Medication 12 ML/HR: at 23:21

## 2023-03-07 NOTE — PROGRESS NOTES
1610:  Jackie Sloan is a 42 y.o.   at 38w5d patient of  Dr Irish Jaramillo at [facility]  who presents to L&D triage with c/o contractions. She reports Positive FM, denies vaginal bleeding and LOF. She also denies Headaches, Scotoma, RUQ pain, and Edema. Urine sample obtained. EFM and toco placed for initial assessment. 1627: SVE performed by Nora Teague MD; 3/50/-3 ; strip reviewed at this time     1706: Patient education provided regarding discharge. Opportunity given for patient to ask questions all questions answered appropriately. Patient ambulated off unit. Patient remains remains pregnant and plans to deliver at [facility].

## 2023-03-07 NOTE — H&P
History & Physical    Name: Von Woods MRN: [id number]  SSN: [id number]    YOB: 1985  Age: 42 y.o. Sex: female        Subjective:     Estimated Date of Delivery: 3/16/23  OB History    Para Term  AB Living   4 2 2   1 2   SAB IAB Ectopic Molar Multiple Live Births   1       0 2      # Outcome Date GA Lbr Addison/2nd Weight Sex Delivery Anes PTL Lv   4 Current            3 Term 19 38w1d 27:12 / 00:09 3.195 kg F Vag-Spont CSE N PETAR   2 SAB            1 Term 14 37w5d  3.08 kg Elias Ramírez is a 42 y.o. G4 [phone] 96 @ 38w5d presenting for term rule out labor. Has been carolina this afternoon. Unsure of frequency but maybe every 10 minutes. No LOF. +FM. No VB. Prenatal course c/b:   - COVID - growth 69%ile @ 34wks, on ASA   - AMA - nml NIPT   - Rh negative     Past Medical History:   Diagnosis Date    Heart abnormality     MVP     No past surgical history on file. Social History     Occupational History    Not on file   Tobacco Use    Smoking status: Never    Smokeless tobacco: Never   Substance and Sexual Activity    Alcohol use: Not Currently    Drug use: Never    Sexual activity: Yes     Partners: Male     Birth control/protection: None     Family History   Problem Relation Age of Onset    Elevated Lipids Mother     Migraines Mother     OSTEOARTHRITIS Father     Diabetes Father     Elevated Lipids Father     Migraines Father     Hypertension Father     Psychiatric Disorder Father     Migraines Sister        No Known Allergies  Prior to Admission medications    Medication Sig Start Date End Date Taking? Authorizing Provider   PNV Comb #2-Iron-Omega 3-FA [id number] mg cmpk Take  by mouth. Yes Provider, Historical   loratadine (Claritin) 10 mg tablet Take 10 mg by mouth. Yes Provider, Historical   aspirin 81 mg chewable tablet Take 81 mg by mouth daily.    Yes Provider, Historical   promethazine (PHENERGAN) 12.5 mg tablet Take  by mouth every six (6) hours as needed for Nausea. Yes Provider, Historical   ibuprofen (ADVIL;MOTRIN) 100 mg/5 mL suspension Take 30 mL by mouth every six (6) hours as needed (pain). Indications: pain  Patient not taking: Reported on 3/7/2023 8/2/19   Chito Coburn MD   sertraline (ZOLOFT) 25 mg tablet Take 1 Tab by mouth daily. Patient not taking: Reported on 3/7/2023 8/3/19   Chito Coburn MD        Review of Systems: A comprehensive review of systems was negative except for that written in the HPI. Objective:     Vitals:  Vitals:    23 1611 23 1619   BP:  126/82   Pulse:  (!) 108   Resp:  18   Temp:  98.2 °F (36.8 °C)   SpO2:  100%   Weight: 72.6 kg (160 lb)    Height: 5' 5\" (1.651 m)         Physical Exam:  Patient without distress. Heart: well perfused  Lung: normal respiratory effort  Abdomen: soft, nontender  Cervical Exam: 3/50/-3  Membranes:  Intact  Fetal Heart Rate: 140s/mod lien/+accels/no decels     Prenatal Labs:   Lab Results   Component Value Date/Time    ABO/Rh(D) A NEGATIVE 2019 03:31 AM    Rubella, External IMMUNE 5.52 2019 12:00 AM    GrBStrep, External Positive 2019 12:00 AM    HBsAg, External NEGATIVE 2019 12:00 AM    HIV, External NON REACTIVE 2019 12:00 AM    RPR, External non reactive 10/11/2013 12:00 AM    Gonorrhea, External NEGATIVE 2019 12:00 AM    Chlamydia, External NEGATIVE 2019 12:00 AM    ABO,Rh A NEGATIVE 2019 12:00 AM         Assessment/Plan:     Active Problems:    * No active hospital problems. *     42yo  here for term rule out labor:    Plan:   - Cervix unchanged from office last week  - Category 1 tracing  - Normotensive    Reviewed labor precautions. Will discharge home with follow up later this week in office.      Edith Ureña MD  3/7/2023  4:43 PM

## 2023-03-08 LAB
AMPHET UR QL SCN: NEGATIVE
BARBITURATES UR QL SCN: NEGATIVE
BENZODIAZ UR QL: NEGATIVE
CANNABINOIDS UR QL SCN: NEGATIVE
COCAINE UR QL SCN: NEGATIVE
DRUG SCRN COMMENT,DRGCM: NORMAL
METHADONE UR QL: NEGATIVE
OPIATES UR QL: NEGATIVE
PCP UR QL: NEGATIVE

## 2023-03-08 PROCEDURE — 4A1HXCZ MONITORING OF PRODUCTS OF CONCEPTION, CARDIAC RATE, EXTERNAL APPROACH: ICD-10-PCS | Performed by: OBSTETRICS & GYNECOLOGY

## 2023-03-08 PROCEDURE — 00HU33Z INSERTION OF INFUSION DEVICE INTO SPINAL CANAL, PERCUTANEOUS APPROACH: ICD-10-PCS | Performed by: OBSTETRICS & GYNECOLOGY

## 2023-03-08 PROCEDURE — 75410000002 HC LABOR FEE PER 1 HR

## 2023-03-08 PROCEDURE — 76060000078 HC EPIDURAL ANESTHESIA

## 2023-03-08 PROCEDURE — 75410000000 HC DELIVERY VAGINAL/SINGLE

## 2023-03-08 PROCEDURE — 65410000002 HC RM PRIVATE OB

## 2023-03-08 PROCEDURE — 0HQ9XZZ REPAIR PERINEUM SKIN, EXTERNAL APPROACH: ICD-10-PCS | Performed by: OBSTETRICS & GYNECOLOGY

## 2023-03-08 PROCEDURE — 75410000003 HC RECOV DEL/VAG/CSECN EA 0.5 HR

## 2023-03-08 PROCEDURE — 74011250637 HC RX REV CODE- 250/637: Performed by: OBSTETRICS & GYNECOLOGY

## 2023-03-08 RX ORDER — OXYCODONE AND ACETAMINOPHEN 5; 325 MG/1; MG/1
1 TABLET ORAL
Status: ACTIVE | OUTPATIENT
Start: 2023-03-08 | End: 2023-03-09

## 2023-03-08 RX ORDER — IBUPROFEN 400 MG/1
800 TABLET ORAL EVERY 8 HOURS
Status: DISCONTINUED | OUTPATIENT
Start: 2023-03-08 | End: 2023-03-08

## 2023-03-08 RX ORDER — HYDROCORTISONE ACETATE PRAMOXINE HCL 2.5; 1 G/100G; G/100G
CREAM TOPICAL AS NEEDED
Status: DISCONTINUED | OUTPATIENT
Start: 2023-03-08 | End: 2023-03-10 | Stop reason: HOSPADM

## 2023-03-08 RX ORDER — NALOXONE HYDROCHLORIDE 0.4 MG/ML
0.4 INJECTION, SOLUTION INTRAMUSCULAR; INTRAVENOUS; SUBCUTANEOUS AS NEEDED
Status: DISCONTINUED | OUTPATIENT
Start: 2023-03-08 | End: 2023-03-10 | Stop reason: HOSPADM

## 2023-03-08 RX ORDER — FAMOTIDINE 20 MG/1
20 TABLET, FILM COATED ORAL 2 TIMES DAILY
Status: DISCONTINUED | OUTPATIENT
Start: 2023-03-08 | End: 2023-03-10 | Stop reason: HOSPADM

## 2023-03-08 RX ORDER — TRIPROLIDINE/PSEUDOEPHEDRINE 2.5MG-60MG
800 TABLET ORAL
Status: DISCONTINUED | OUTPATIENT
Start: 2023-03-08 | End: 2023-03-10 | Stop reason: HOSPADM

## 2023-03-08 RX ADMIN — IBUPROFEN 800 MG: 100 SUSPENSION ORAL at 18:41

## 2023-03-08 RX ADMIN — FAMOTIDINE 20 MG: 20 TABLET, FILM COATED ORAL at 08:50

## 2023-03-08 RX ADMIN — IBUPROFEN 800 MG: 100 SUSPENSION ORAL at 11:00

## 2023-03-08 RX ADMIN — FAMOTIDINE 20 MG: 20 TABLET, FILM COATED ORAL at 18:41

## 2023-03-08 NOTE — PROGRESS NOTES
Verbally spoke with OB Dr. Eddie Santacruz for patient's request for Liquid Motrin instead of  mg. Pt also requesting famotidine for gas pain/reflux - took 20 mg BID at home.      Orders placed by MD in patient's [facility]

## 2023-03-08 NOTE — LACTATION NOTE
This note was copied from a baby's chart. 23 1630   Visit Information   Lactation Consult Visit Type IP Initial Consult   Visit Length 15 minutes   Reason for Visit Normal [place] Visit;Education   Breast- Feeding Assessment   Breast-Feeding Experience Yes  Equipment: Has a breast pump; Not sure of brand   Breast Assessment   Breast Declined   Mother/Infant Observation   Infant Observation Frenulum checked  (Oral assessment WDL)     Reviewed the \"Your Guide to Breastfeeding\" booklet. Discussed the typical feeding characteristics in the 1st and 2nd DOL and signs of adequate intake. Baby just completed a feeing and mother states he is latching well. Discussed a feeding plan and mother's questions were addressed. Plan:  Offer lots of skin to skin and access to the breast.  Feed baby at early signs of hunger every 2-3 hours. Assure a deep latch, check that baby's lips are turned outward and use breast compression to keep baby actively feeding. Pump/hand express for poor feeds and offer baby EBM. Monitor wet and dirty diapers for signs of adequate intake. Follow instructions for managing engorgement.

## 2023-03-08 NOTE — PROGRESS NOTES
NST:    Baseline: 125    Variability: Moderate    Accelerations: Two 15 x 15 accels in a ten minute window    Decelerations: None    Contractions: Q 3 minutes    RNST    This test was preformed under my supervision and interpreted by me.     Levar Sanchez M.D.

## 2023-03-08 NOTE — ANESTHESIA PROCEDURE NOTES
Epidural Block    Patient location during procedure: OB  Reason for block: labor epidural  Staffing  Performed: CRNA   Preanesthetic Checklist  Completed: patient identified, IV checked, site marked, risks and benefits discussed, surgical consent, monitors and equipment checked, pre-op evaluation, timeout performed and fire risk safety assessment completed and verbalized  Block Placement  Patient position: sitting  Prep: DuraPrep  Sterility prep: mask, hand, gloves, drape and cap  Sedation level: no sedation  Patient monitoring: heart rate, continuous pulse oximetry and frequent blood pressure checks  Approach: midline  Location: lumbar  Lumbar location: L3-L4  Epidural  Loss of resistance technique: air  Guidance: landmark technique  Needle  Epidural needle type: Kenan Sánchez.    Needle gauge: 17 G  Needle insertion depth: 6.5 cm  Catheter size: 19 G  Catheter at skin depth: 10 cm  Catheter securement method: clear occlusive dressing and surgical tape  Test dose: negative  Assessment  Sensory level: T10  Block outcome: pain improved  Number of attempts: 1  Procedure assessment: patient tolerated procedure well with no immediate complications

## 2023-03-08 NOTE — L&D DELIVERY NOTE
Delivery Summary    Patient: Franc Jean MRN: [id number]  SSN: [id number]    YOB: 1985  Age: 42 y.o. Sex: female     Patient delivered a viable male infant via  over an intact perineum with no difficulty with delivery of shoulders requiring maternal expulsive efforts only. Small 1st degree laceration repaired with 2-0 chromic in the usual fashion. Introitus easily admitted two fingers after repair. No comps. Mother and infant doing well. Information for the patient's :  Esperanza Kumar [[id number]]     Labor Events:    Labor: No    Steroids: Full Course   Cervical Ripening Date/Time:       Cervical Ripening Type: None   Antibiotics During Labor: No   Rupture Identifier: Sac 1    Rupture Date/Time: 3/8/2023 2:00 AM   Rupture Type: SROM   Amniotic Fluid Volume:  Moderate    Amniotic Fluid Description: Clear    Amniotic Fluid Odor: None    Induction: None       Induction Date/Time:        Indications for Induction:      Augmentation: None   Augmentation Date/Time:      Indications for Augmentation:     Labor complications: None       Additional complications:        Delivery Events:  Indications For Episiotomy:     Episiotomy: None   Perineal Laceration(s): 1st   Repaired:     Periurethral Laceration Location:      Repaired:     Labial Laceration Location:     Repaired:     Sulcal Laceration Location:     Repaired:     Vaginal Laceration Location:     Repaired:     Cervical Laceration Location:     Repaired:     Repair Suture: Chromic 2-0   Number of Repair Packets: 1   Estimated Blood Loss (ml):  ml   Quantitative Blood Loss (ml)                Delivery Date: 3/8/2023    Delivery Time: 2:54 AM  Delivery Type: Vaginal, Spontaneous  Sex:  Male    Gestational Age: 38w7d   Delivery Clinician:  Sirisha Delarosa  Living Status: Living   Delivery Location: L&D            APGARS  One minute Five minutes Ten minutes   Skin color: 0   1        Heart rate: 2   2        Grimace: 2   2        Muscle tone: 2   2        Breathin   2        Totals: 8   9            Presentation: Vertex    Position:        Resuscitation Method:  None     Meconium Stained: None      Cord Information: 3 Vessels  Complications: None  Cord around:    Delayed cord clamping? Yes  Cord clamped date/time:   Disposition of Cord Blood: Lab    Blood Gases Sent?: No    Placenta:  Date/Time: 3/8/2023  2:59 AM  Removal: Spontaneous      Appearance: Intact     Tiff Measurements:  Birth Weight: 3.305 kg      Birth Length: 49.5 cm      Head Circumference: 33 cm      Chest Circumference: 33 cm     Abdominal Girth: 30.5 cm    Other Providers:   Anai HARRIS;STEVE LOCKHART;;;;;;;;Ethan CUEVAS Pac, Obstetrician;Primary Nurse;Primary  Nurse;Nicu Nurse;Neonatologist;Anesthesiologist;Crna;Nurse Practitioner;Midwife;Nursery Nurse;Charge Nurse;Scrub Tech;Staff Nurse         Group B Strep:   Lab Results   Component Value Date/Time    GrANTONtrezeke, External Neg 2023 12:00 AM     Information for the patient's :  Alejandrina Monroe [[id number]]   No results found for: ABORH, PCTABR, PCTDIG, BILI, ABORHEXT, ABORH   No results for input(s): PCO2CB, PO2CB, HCO3I, SO2I, IBD, PTEMPI, SPECTI, PHICB, ISITE, IDEV, IALLEN in the last 72 hours.

## 2023-03-08 NOTE — ROUTINE PROCESS
[address] [facility]. Coretta Olszewski, ENZO at bedside to perform straight cath due to pt needing to void and not able to feel legs fully    1055 - Pt ambulated to the bathroom x 2 assist with no complaints. Pt performed rodger and self care and voided. Pt ambulated x 1 standby assist back to bed with no complications.

## 2023-03-08 NOTE — PROGRESS NOTES
Bedside and Verbal shift change report given to zeke medina (oncoming nurse)  . Report included the following information SBAR.

## 2023-03-08 NOTE — H&P
History & Physical    Name: Sriram Swan MRN: [id number]  SSN: [id number]    YOB: 1985  Age: 42 y.o. Sex: female        Subjective:   CC: Contractions  Estimated Date of Delivery: 3/16/23  OB History    Para Term  AB Living   4 2 2   1 2   SAB IAB Ectopic Molar Multiple Live Births   1       0 2      # Outcome Date GA Lbr Addison/2nd Weight Sex Delivery Anes PTL Lv   4 Current            3 Term 19 38w1d 27:12 / 00:09 3.195 kg F Vag-Spont CSE N PETAR   2 SAB            1 Term 14 37w5d  3.08 kg Tomas Dance       Ms. Evelena Olszewski is admitted with pregnancy at 38w5d for active labor. Patient denies ROM,vaginal bleeding, or decreased FM. Prenatal course was complicated by  AMA and second trimester bleed . Please see 874/480 [facility] prenatal records from 23 through 3/2/23 for details. Past Medical History:   Diagnosis Date    Heart abnormality     MVP     History reviewed. No pertinent surgical history. Social History     Occupational History    Not on file   Tobacco Use    Smoking status: Never    Smokeless tobacco: Never   Substance and Sexual Activity    Alcohol use: Not Currently    Drug use: Never    Sexual activity: Yes     Partners: Male     Birth control/protection: None     Family History   Problem Relation Age of Onset    Elevated Lipids Mother     Migraines Mother     OSTEOARTHRITIS Father     Diabetes Father     Elevated Lipids Father     Migraines Father     Hypertension Father     Psychiatric Disorder Father     Migraines Sister      No Known Allergies  Prior to Admission medications    Medication Sig Start Date End Date Taking? Authorizing Provider   PNV Comb #2-Iron-Omega 3-FA [id number] mg cmpk Take  by mouth. Provider, Historical   loratadine (Claritin) 10 mg tablet Take 10 mg by mouth. Provider, Historical   aspirin 81 mg chewable tablet Take 81 mg by mouth daily.     Provider, Historical   promethazine (PHENERGAN) 12.5 mg tablet Take by mouth every six (6) hours as needed for Nausea. Provider, Historical   ibuprofen (ADVIL;MOTRIN) 100 mg/5 mL suspension Take 30 mL by mouth every six (6) hours as needed (pain). Indications: pain  Patient not taking: Reported on 3/7/2023 8/2/19   Annamaria Coburn MD   sertraline (ZOLOFT) 25 mg tablet Take 1 Tab by mouth daily. Patient not taking: Reported on 3/7/2023 8/3/19   Annamaria Coburn MD        Review of Systems   Constitutional:  Negative for fever. HENT:  Negative for sore throat. Eyes:  Negative for visual disturbance. Respiratory:  Negative for cough and shortness of breath. Cardiovascular:  Negative for chest pain and leg swelling. Gastrointestinal:  Negative for nausea and vomiting. Genitourinary:  Negative for dysuria, vaginal bleeding and vaginal discharge. Musculoskeletal:  Negative for arthralgias and myalgias. Skin:  Negative for rash. Neurological:  Negative for headaches. Hematological:  Negative for adenopathy. Psychiatric/Behavioral:  Negative for agitation, behavioral problems, confusion and decreased concentration. Objective:     Vitals:  Vitals:    03/07/23 2207 03/07/23 2208   BP:  134/74   Pulse:  83   Resp:  18   Temp:  98 °F (36.7 °C)   SpO2: 100% 100%        Physical Exam  Vitals and nursing note reviewed. Exam conducted with a chaperone present. Constitutional:       General: She is not in acute distress. Appearance: Normal appearance. She is normal weight. She is not ill-appearing, toxic-appearing or diaphoretic. HENT:      Head: Normocephalic and atraumatic. Right Ear: External ear normal.      Left Ear: External ear normal.   Eyes:      General: No scleral icterus. Extraocular Movements: Extraocular movements intact. Cardiovascular:      Rate and Rhythm: Normal rate and regular rhythm. Heart sounds: Normal heart sounds. No murmur heard. No friction rub. No gallop.    Pulmonary:      Effort: Pulmonary effort is normal. No respiratory distress. Breath sounds: Normal breath sounds. No stridor. No wheezing, rhonchi or rales. Abdominal:      General: There is no distension. Palpations: There is no mass. Tenderness: There is no abdominal tenderness. There is no right CVA tenderness, left CVA tenderness, guarding or rebound. Hernia: No hernia is present. Genitourinary:     General: Normal vulva. Musculoskeletal:         General: No swelling, tenderness, deformity or signs of injury. Normal range of motion. Cervical back: Normal range of motion and neck supple. No rigidity or tenderness. Right lower leg: No edema. Left lower leg: No edema. Lymphadenopathy:      Cervical: No cervical adenopathy. Skin:     General: Skin is warm and dry. Capillary Refill: Capillary refill takes less than 2 seconds. Coloration: Skin is not jaundiced or pale. Findings: No bruising, erythema, lesion or rash. Neurological:      Mental Status: She is alert and oriented to person, place, and time. Deep Tendon Reflexes: Reflexes normal.   Psychiatric:         Mood and Affect: Mood normal.         Behavior: Behavior normal.         Thought Content:  Thought content normal.         Judgment: Judgment normal.       Cervical Exam: 5 cm dilated    90% effaced    -1 station    Presenting Part: cephalic  Uterine Activity: Frequency: Every 3 minutes   Membranes: Intact  Fetal Heart Rate: Reactive     Prenatal Labs:   Lab Results   Component Value Date/Time    ABO/Rh(D) A NEGATIVE 08/01/2019 03:31 AM    Rubella, External IMMUNE 5.52 01/03/2019 12:00 AM    GrBStrep, External Positive 01/03/2019 12:00 AM    HBsAg, External NEGATIVE 01/03/2019 12:00 AM    HIV, External NON REACTIVE 01/03/2019 12:00 AM    RPR, External non reactive 10/11/2013 12:00 AM    Gonorrhea, External NEGATIVE 01/03/2019 12:00 AM    Chlamydia, External NEGATIVE 01/03/2019 12:00 AM    ABO,Rh A NEGATIVE 01/03/2019 12:00 AM Impression/Plan:     1) IUP at 45 w 5 d in active labor  2) AMA     Plan: Admit for labor. Group B Strep was negative. Epidural prn. Anticipate .

## 2023-03-08 NOTE — PROGRESS NOTES
TRANSFER - IN REPORT:    Verbal report received from annamaria gutierrez(name) on Franc Jean  being received from l&d(unit) for routine progression of care      Report consisted of patients Situation, Background, Assessment and   Recommendations(SBAR). Information from the following report(s) SBAR was reviewed with the receiving nurse. Opportunity for questions and clarification was provided. Assessment completed upon patients arrival to unit and care assumed.

## 2023-03-08 NOTE — PROGRESS NOTES
2202: Evangelina Arora is a 42 y.o.   at 38w5d patient of Dr. Pasha Paula at [address] [facility] who presents to L&D triage with c/o carolina every 2-3 mins. She reports Positive FM, denies vaginal bleeding and LOF. She also denies Headaches, Scotoma, RUQ pain, and Edema. Urine sample obtained. EFM and toco placed for initial assessment. 5826: Report given to ARVIND Barth

## 2023-03-08 NOTE — PROGRESS NOTES
Post-Partum Day Number 0 Progress Note    Bayron Eastman     Assessment: Doing well, post partum day 0 from  p term labor. Plan:  - Continue routine postpartum and perineal care as well as maternal education.  - The risks and benefits of the circumcision  procedure and anesthesia including: bleeding, infection, variability of cosmetic results were discussed at length with the mother. She is aware that future repeat procedures may be necessary. She gives informed consent to proceed as noted and her questions are answered. Circ will be held until PPD1.   - Plan discharge home [address] Discharge Date: Tomorrow or PPD2. Information for the patient's :  Jose Askew [[id number]]   Vaginal, Spontaneous  Patient doing well without significant complaint. Voiding without difficulty, normal lochia. Vitals:  Visit Vitals  /70 (BP 1 Location: Left upper arm, BP Patient Position: Sitting)   Pulse 86   Temp 97.9 °F (36.6 °C)   Resp 17   Ht 5' 5\" (1.651 m)   Wt 72.6 kg (160 lb)   SpO2 98%   Breastfeeding Unknown   BMI 26.63 kg/m²     Temp (24hrs), Av °F (36.7 °C), Min:97.9 °F (36.6 °C), Max:98.2 °F (36.8 °C)        Exam:   Patient without distress. Fundus firm, nontender per nursing fundal checks. Perineum with normal lochia noted per nursing assessment. Lower extremities are negative for pathological edema.     Labs:     Recent Results (from the past 24 hour(s))   DRUG SCREEN, URINE    Collection Time: 23 10:59 PM   Result Value Ref Range    AMPHETAMINES Negative NEG      BARBITURATES Negative NEG      BENZODIAZEPINES Negative NEG      COCAINE Negative NEG      METHADONE Negative NEG      OPIATES Negative NEG      PCP(PHENCYCLIDINE) Negative NEG      THC (TH-CANNABINOL) Negative NEG      Drug screen comment (NOTE)    TYPE & SCREEN    Collection Time: 23 10:59 PM   Result Value Ref Range    Crossmatch Expiration 03/10/2023,2530 ABO/Rh(D) A NEGATIVE     Antibody screen POS     Antibody ID Anti-D passively acquired     Unit number [id number]     Blood component type  LRIR     Unit division 00     Status of unit ALLOCATED     Crossmatch result Compatible     Unit number [id number]     Blood component type  LR,2     Unit division 00     Status of unit ALLOCATED     Crossmatch result Compatible    CBC WITH AUTOMATED DIFF    Collection Time: 03/07/23 10:59 PM   Result Value Ref Range    WBC 16.0 (H) 3.6 - 11.0 K/uL    RBC 4.00 3.80 - 5.20 M/uL    HGB 11.3 (L) 11.5 - 16.0 g/dL    HCT 36.4 35.0 - 47.0 %    MCV 91.0 80.0 - 99.0 FL    MCH 28.3 26.0 - 34.0 PG    MCHC 31.0 30.0 - 36.5 g/dL    RDW 14.6 (H) 11.5 - 14.5 %    PLATELET 662 (H) 753 - 400 K/uL    MPV 9.3 8.9 - 12.9 FL    NRBC 0.2 (H) 0  WBC    ABSOLUTE NRBC 0.03 (H) 0.00 - 0.01 K/uL    NEUTROPHILS 86 (H) 32 - 75 %    LYMPHOCYTES 8 (L) 12 - 49 %    MONOCYTES 5 5 - 13 %    EOSINOPHILS 0 0 - 7 %    BASOPHILS 0 0 - 1 %    IMMATURE GRANULOCYTES 1 (H) 0.0 - 0.5 %    ABS. NEUTROPHILS 13.8 (H) 1.8 - 8.0 K/UL    ABS. LYMPHOCYTES 1.3 0.8 - 3.5 K/UL    ABS. MONOCYTES 0.8 0.0 - 1.0 K/UL    ABS. EOSINOPHILS 0.0 0.0 - 0.4 K/UL    ABS. BASOPHILS 0.0 0.0 - 0.1 K/UL    ABS. IMM.  GRANS. 0.1 (H) 0.00 - 0.04 K/UL    DF AUTOMATED

## 2023-03-08 NOTE — ANESTHESIA PREPROCEDURE EVALUATION
Relevant Problems   No relevant active problems       Anesthetic History   No history of anesthetic complications            Review of Systems / Medical History  Patient summary reviewed, nursing notes reviewed and pertinent labs reviewed    Pulmonary  Within defined limits              Comments: Former smoker   Neuro/Psych   Within defined limits           Cardiovascular  Within defined limits                     GI/Hepatic/Renal     GERD           Endo/Other  Within defined limits           Other Findings   Comments: Intrauterine Pregnancy, multigravida    GERD - controlled with protonix  Former smoker    COVID+ Jan 2023, taking ASA 81mg daily         Physical Exam    Airway  Mallampati: II  TM Distance: 4 - 6 cm  Neck ROM: normal range of motion   Mouth opening: Normal     Cardiovascular  Regular rate and rhythm,  S1 and S2 normal,  no murmur, click, rub, or gallop    Rate: normal         Dental  No notable dental hx       Pulmonary  Breath sounds clear to auscultation               Abdominal  GI exam deferred       Other Findings              Anesthetic Plan    ASA: 2  Anesthesia type: epidural            Anesthetic plan and risks discussed with: Patient and Family      CBC from 12/22/2022   HGB 11.9  HCT 41.2

## 2023-03-09 PROCEDURE — 65410000002 HC RM PRIVATE OB

## 2023-03-09 PROCEDURE — 74011250637 HC RX REV CODE- 250/637: Performed by: OBSTETRICS & GYNECOLOGY

## 2023-03-09 RX ORDER — CETIRIZINE HYDROCHLORIDE 10 MG/1
10 TABLET ORAL
Status: DISCONTINUED | OUTPATIENT
Start: 2023-03-09 | End: 2023-03-10 | Stop reason: HOSPADM

## 2023-03-09 RX ORDER — LORATADINE 10 MG/1
10 TABLET ORAL
Status: DISCONTINUED | OUTPATIENT
Start: 2023-03-09 | End: 2023-03-09 | Stop reason: CLARIF

## 2023-03-09 RX ADMIN — FAMOTIDINE 20 MG: 20 TABLET, FILM COATED ORAL at 08:55

## 2023-03-09 RX ADMIN — IBUPROFEN 800 MG: 100 SUSPENSION ORAL at 03:13

## 2023-03-09 RX ADMIN — IBUPROFEN 800 MG: 100 SUSPENSION ORAL at 14:27

## 2023-03-09 RX ADMIN — FAMOTIDINE 20 MG: 20 TABLET, FILM COATED ORAL at 21:03

## 2023-03-09 NOTE — ROUTINE PROCESS
Bedside and Verbal shift change report given to ARVIND Teresa RN (oncoming nurse) by ZOË Magana RN (offgoing nurse). Report included the following information SBAR, Kardex, Intake/Output, and MAR.

## 2023-03-09 NOTE — PROGRESS NOTES
Post-Partum Day Number 1 Progress Note    Franc Jean     Assessment: Doing well, post partum day 1 sp  after term labor    Plan:  - Continue routine postpartum and perineal care as well as maternal education.  - The risks and benefits of the circumcision  procedure and anesthesia including: bleeding, infection, variability of cosmetic results were discussed at length with the mother. She is aware that future repeat procedures may be necessary. She gives informed consent to proceed as noted and her questions are answered. - Plan discharge home [id number] Trey Charles Discharge Date: Tomorrow. - rh neg - rhogam studies    Information for the patient's :  Esperanza Kumar [[id number]]   Vaginal, Spontaneous  Patient doing well without significant complaint. Voiding without difficulty, normal lochia. Vitals:  Visit Vitals  /67   Pulse 79   Temp 98.1 °F (36.7 °C)   Resp 16   Ht 5' 5\" (1.651 m)   Wt 72.6 kg (160 lb)   SpO2 100%   Breastfeeding Unknown   BMI 26.63 kg/m²     Temp (24hrs), Av.3 °F (36.8 °C), Min:98.1 °F (36.7 °C), Max:98.5 °F (36.9 °C)        Exam:   Patient without distress. Fundus firm, nontender per nursing fundal checks. Perineum with normal lochia noted per nursing assessment. Lower extremities are negative for pathological edema.     Labs:     Lab Results   Component Value Date/Time    WBC 16.0 (H) 2023 10:59 PM    WBC 15.6 (H) 2019 03:30 PM    WBC 9.1 2015 10:03 PM    WBC 16.8 (H) 2014 06:25 PM    HGB 11.3 (L) 2023 10:59 PM    HGB 10.3 (L) 2019 03:30 PM    HGB 13.9 2015 10:03 PM    HGB 11.0 (L) 2014 06:25 PM    HCT 36.4 2023 10:59 PM    HCT 32.7 (L) 2019 03:30 PM    HCT 42.0 2015 10:03 PM    HCT 34.8 (L) 2014 06:25 PM    PLATELET 702 (H)  10:59 PM    PLATELET 517 (H)  03:30 PM    PLATELET 208  10:03 PM    PLATELET 381 (H)  06:25 PM       No results found for this or any previous visit (from the past 24 hour(s)).

## 2023-03-09 NOTE — PROGRESS NOTES
Zyrtec 10 mg was therapeutically interchanged for Loratadine (Claritin) 10 mg  per the P&T Committee approved Therapeutic Interchanges Policy.     [address], Pharmacist  3/9/2023 11:22 AM

## 2023-03-10 VITALS
HEART RATE: 80 BPM | TEMPERATURE: 98 F | WEIGHT: 160 LBS | OXYGEN SATURATION: 100 % | BODY MASS INDEX: 26.66 KG/M2 | RESPIRATION RATE: 20 BRPM | HEIGHT: 65 IN | SYSTOLIC BLOOD PRESSURE: 128 MMHG | DIASTOLIC BLOOD PRESSURE: 78 MMHG

## 2023-03-10 LAB
ABO + RH BLD: NORMAL
BLD PROD TYP BPU: NORMAL
BLD PROD TYP BPU: NORMAL
BLOOD GROUP ANTIBODIES SERPL: NORMAL
BLOOD GROUP ANTIBODIES SERPL: NORMAL
BPU ID: NORMAL
BPU ID: NORMAL
CROSSMATCH RESULT,%XM: NORMAL
CROSSMATCH RESULT,%XM: NORMAL
SPECIMEN EXP DATE BLD: NORMAL
STATUS OF UNIT,%ST: NORMAL
STATUS OF UNIT,%ST: NORMAL
UNIT DIVISION, %UDIV: 0
UNIT DIVISION, %UDIV: 0

## 2023-03-10 PROCEDURE — 74011250637 HC RX REV CODE- 250/637: Performed by: OBSTETRICS & GYNECOLOGY

## 2023-03-10 RX ORDER — TRIPROLIDINE/PSEUDOEPHEDRINE 2.5MG-60MG
800 TABLET ORAL
Qty: 3600 ML | Refills: 0 | Status: SHIPPED | OUTPATIENT
Start: 2023-03-10 | End: 2023-04-09

## 2023-03-10 RX ORDER — ACETAMINOPHEN 500 MG
1000 TABLET ORAL
Qty: 60 TABLET | Refills: 0 | Status: SHIPPED | OUTPATIENT
Start: 2023-03-10

## 2023-03-10 RX ADMIN — IBUPROFEN 400 MG: 100 SUSPENSION ORAL at 07:40

## 2023-03-10 NOTE — DISCHARGE INSTRUCTIONS
Vaginal Childbirth: Care Instructions  Overview     Vaginal birth means delivering a baby through the birth canal (vagina). During labor, the uterus tightens (contracts) regularly to thin and open the cervix and to push the baby out through the birth canal.  Your body will slowly heal in the next few weeks. It's easy to get too tired and overwhelmed during the first weeks after your baby is born. Changes in your hormones can shift your mood without warning. You may find it hard to meet the extra demands on your energy and time. Take it easy on yourself. Follow-up care is a key part of your treatment and safety. Be sure to make and go to all appointments, and call your doctor if you are having problems. It's also a good idea to know your test results and keep a list of the medicines you take. How can you care for yourself at home? Vaginal bleeding and cramps  After delivery, you will have a bloody discharge from your vagina. This will turn pink within a week and then white or yellow after about 10 days. It may last for 2 to 4 weeks or longer, until the uterus has healed. Use sanitary pads until you stop bleeding. Using pads makes it easier to monitor your bleeding. Don't worry if you pass some blood clots, as long as they are smaller than a golf ball. If you have a tear or stitches in your vaginal area, change the pad at least every 4 hours. This will help prevent soreness and infection. You may have cramps for the first few days after childbirth. These are normal and occur as the uterus shrinks to normal size. Take an over-the-counter pain medicine, such as acetaminophen (Tylenol), ibuprofen (Advil, Motrin), or naproxen (Aleve), for cramps. Read and follow all instructions on the label. Do not take aspirin, because it can cause more bleeding. Do not take two or more pain medicines at the same time unless the doctor told you to. Many pain medicines have acetaminophen, which is Tylenol.  Too much acetaminophen (Tylenol) can be harmful. Stitches  If you have stitches, they will dissolve on their own and don't need to be removed. Follow your doctor's instructions for cleaning the stitched area. Put ice or a cold pack on your painful area for 10 to 20 minutes at a time, several times a day, for the first few days. Put a thin cloth between the ice and your skin. Sit in a few inches of warm water (sitz bath) 3 times a day and after bowel movements. The warm water helps with pain and itching. If you don't have a tub, a warm shower might help. Breast fullness  Your breasts may overfill (engorge) in the first few days after delivery. To help milk flow and to relieve pain, warm your breasts in the shower or by using warm, moist towels before nursing. If you aren't nursing, don't put warmth on your breasts or touch your breasts. Wear a bra that fits well and use ice until the fullness goes away. This usually takes 2 to 3 days. Put ice or a cold pack on your breast after nursing to reduce swelling and pain. Put a thin cloth between the ice and your skin. Activity  Eat a balanced diet. Don't try to lose weight by cutting calories. Keep taking your prenatal vitamins, or take a multivitamin. Get as much rest as you can. Try to take naps when your baby sleeps during the day. Get some exercise every day. But don't do any heavy exercise until your doctor says it is okay. Wait until you are healed (about 4 to 6 weeks) before you have sexual intercourse. Your doctor will tell you when it is okay to have sex. If you don't want to get pregnant, talk to your doctor about birth control. You can get pregnant even before your period returns. Also, you can get pregnant while you are breastfeeding. Mental health  It's normal to have some sadness, anxiety, sleeplessness, and mood swings after you go home.  If you feel upset or hopeless for more than a few days or are having trouble doing the things you need to do, talk to your doctor. Constipation and hemorrhoids  Drink plenty of fluids. If you have kidney, heart, or liver disease and have to limit fluids, talk with your doctor before you increase the amount of fluids you drink. Eat plenty of fiber each day. Have a bran muffin or bran cereal for breakfast. Try eating a piece of fruit for a mid-afternoon snack. For painful, itchy hemorrhoids, put ice or a cold pack on the area several times a day for 10 minutes at a time. Follow this by putting a warm compress on the area for another 10 to 20 minutes or by sitting in a shallow, warm bath. When should you call for help? Share this information with your partner, family, or a friend. They can help you watch for warning signs. Call 911  anytime you think you may need emergency care. For example, call if:    You have thoughts of harming yourself, your baby, or another person. You passed out (lost consciousness). You have chest pain, are short of breath, or cough up blood. You have a seizure. Call your doctor now or seek immediate medical care if:    You have signs of hemorrhage (too much bleeding), such as:  Heavy vaginal bleeding. This means that you are soaking through one or more pads in an hour. Or you pass blood clots bigger than an egg. Feeling dizzy or lightheaded, or you feel like you may faint. Feeling so tired or weak that you cannot do your usual activities. A fast or irregular heartbeat. New or worse belly pain. You have signs of infection, such as:  A fever. Vaginal discharge that smells bad. New or worse belly pain. You have symptoms of a blood clot in your leg (called a deep vein thrombosis), such as:  Pain in the calf, back of the knee, thigh, or groin. Redness and swelling in your leg or groin. You have signs of preeclampsia, such as:  Sudden swelling of your face, hands, or feet. New vision problems (such as dimness, blurring, or seeing spots). A severe headache.    Watch closely for changes in your health, and be sure to contact your doctor if:    Your vaginal bleeding isn't decreasing. You feel sad, anxious, or hopeless for more than a few days. You are having problems with your breasts or breastfeeding. Where can you learn more? Go to http://www.gray.com/  Enter Q237 in the search box to learn more about \"Vaginal Childbirth: Care Instructions. \"  Current as of: February 23, 2022               Content Version: 13.4  © 2006-2022 [employer]. Care instructions adapted under license by [employer] (which disclaims liability or warranty for this information). If you have questions about a medical condition or this instruction, always ask your healthcare professional. [address] any warranty or liability for your use of this information. Breastfeeding: Care Instructions  Overview     Breastfeeding has many benefits. It may lower your baby's chances of getting an infection. It also may make it less likely that your baby will have problems such as diabetes and obesity later in life. Breastfeeding also helps you bond with your baby. In the first days after birth, your breasts make a thick, yellow liquid called colostrum. This liquid gives your baby nutrients and antibodies against infection. It is all that babies need in the first days after birth. Your breasts will fill with milk a few days after the birth. Breastfeeding is a skill that gets better with practice. Be patient with yourself and your baby. If you have trouble, you can get help and keep breastfeeding. Follow-up care is a key part of your treatment and safety. Be sure to make and go to all appointments, and call your doctor if you are having problems. It's also a good idea to know your test results and keep a list of the medicines you take. How can you care for yourself at home? Breastfeed your baby whenever your baby is hungry.  In the first 2 weeks, your baby will breastfeed at least 8 times in a 24-hour period. This will help you keep up your supply of milk. Signs that your baby is hungry include:  Sucking on their hands. Mason their lips. Turning their head toward your breast.  Put a bed pillow or a nursing pillow on your lap to support your arms and your baby. Hold your baby in a comfortable position. You can hold your baby in several ways. One of the most common positions is the cradle hold. One arm supports your baby, with your baby's head in the bend of your elbow. Your open hand supports your baby's bottom or back. Your baby's belly lies against yours. If you had your baby by , or , try the football hold. This position keeps your baby off your belly. Tuck your baby under your arm, with your baby's body along the side you will be feeding on. Support your baby's upper body with your arm. With that hand you can control your baby's head to bring their mouth to your breast.  Try different positions with each feeding. If you are having problems, ask for help from your doctor or a lactation consultant. To get your baby to latch on:  Support and narrow your breast with one hand using a \"U hold,\" with your thumb on the outer side of your breast and your fingers on the inner side. You can also use a \"C hold,\" with all your fingers below the nipple and your thumb above it. Try the different holds to get the deepest latch for whichever breastfeeding position you use. Your other arm is behind your baby's back, with your hand supporting the base of the baby's head. Position your fingers and thumb to point toward your baby's ears. You can touch your baby's lower lip with your nipple to get your baby's mouth to open. Wait until your baby opens up really wide, like a big yawn. Then be sure to bring the baby quickly to your breast--not your breast to the baby.  As you bring your baby toward your breast, use your other hand to support the breast and guide it into your baby's mouth. Both the nipple and a large portion of the darker area around the nipple (areola) should be in the baby's mouth. The baby's lips should be flared outward, not folded in (inverted). Listen for a regular sucking and swallowing pattern while the baby is feeding. If you can't see or hear a swallowing pattern, watch the baby's ears. They will wiggle slightly when the baby swallows. If the baby's nose appears to be blocked by your breast, bring your baby's body closer to you. This will help tilt the baby's head back slightly, so just the edge of one nostril is clear for breathing. When your baby is latched, you can usually remove your hand from supporting your breast and use it to cradle under your baby. Now just relax and breastfeed your baby. You will know that your baby is feeding well when: Your baby's mouth covers a lot of the areola, and the lips are flared out. Your baby's chin and nose rest against your breast.  Sucking is deep and rhythmic, with short pauses. You are able to see and hear your baby swallowing. You do not feel pain in your nipple. Offer both breasts to your baby at each feeding. Each time you breastfeed, switch which breast you start with. Anytime you need to remove your baby from the breast, put one finger in the corner of your baby's mouth. Push your finger between your baby's gums to gently break the seal. If you don't break the tight seal before you remove your baby, your nipples can become sore, cracked, or bruised. After you finish feeding, gently pat your baby's back to let out any swallowed air. After your baby burps, offer the breast again, or offer the other breast. Sometimes a baby will want to keep feeding after being burped. When should you call for help? Call your doctor now or seek immediate medical care if:    You have symptoms of a breast infection, such as:   Increased pain, swelling, redness, or warmth around a breast.  Red streaks extending from the breast.  Pus draining from a breast.  A fever. Your baby has no wet diapers for 6 hours. Watch closely for changes in your health, and be sure to contact your doctor if:    Your baby has trouble latching on to your breast.     You continue to have pain or discomfort when breastfeeding. You have other questions or concerns. Where can you learn more? Go to http://www.gray.com/  Enter P492 in the search box to learn more about \"Breastfeeding: Care Instructions. \"  Current as of: February 23, 2022               Content Version: 13.4  © 6561-9356 [employer]. Care instructions adapted under license by [employer] (which disclaims liability or warranty for this information). If you have questions about a medical condition or this instruction, always ask your healthcare professional. [address] any warranty or liability for your use of this information.

## 2023-03-10 NOTE — PROGRESS NOTES
Bedside and Verbal shift change report given to DYLAN Mason RN (oncoming nurse) by MAKENZIE Fried (offgoing nurse). Report included the following information SBAR, Kardex, Procedure Summary, Intake/Output, MAR, and Recent Results.

## 2023-03-10 NOTE — LACTATION NOTE
This note was copied from a baby's chart.     03/10/23 4289   Visit Information   Lactation Consult Visit Type IP Consult Follow Up   Visit Length 15 minutes   Referral Received From Lactation Consultant Follow-up   Reason for Visit Normal [place] Visit;Education   Breast- Feeding Assessment   Breast-Feeding Experience Yes  Equipment: Motif Duo breast pump; 24mm nipple shield   Breast Assessment    Breast Declined   Mother/Infant Observation   Infant Observation Frenulum checked  (Oral assessment WDL)     Reviewed the typical feeding characteristics in the 2nd DOL and signs of adequate intake. Mother states that breastfeeding has been going well. Discussed a feeding plan and how to manage breast engorgement. Mother's questions were addressed. Plan:  Offer lots of skin to skin and access to the breast.  Feed baby at early signs of hunger every 2-3 hours. Assure a deep latch, check that baby's lips are turned outward and use breast compression to keep baby actively feeding. Pump/hand express for poor feeds and offer baby EBM. Monitor wet and dirty diapers for signs of adequate intake. Follow instructions for managing engorgement.

## 2023-03-10 NOTE — PROGRESS NOTES
Post-Partum Day Number 2 Progress Note    Walter Ingram     Assessment: Doing well, post partum day 2     Plan:   - Discharge home today  - Follow up in office in 6 week(s) with [facility].  - Pain medication prescription(s) sent. - Questions answered. Information for the patient's :  Alton Hitchcock [[id number]]   Vaginal, Spontaneous  Patient doing well without significant complaint. Voiding without difficulty, normal lochia. Ready for discharge home. Vitals:  Visit Vitals  /78   Pulse 80   Temp 98 °F (36.7 °C)   Resp 20   Ht 5' 5\" (1.651 m)   Wt 72.6 kg (160 lb)   SpO2 100%   Breastfeeding Unknown   BMI 26.63 kg/m²     Temp (24hrs), Av.2 °F (36.8 °C), Min:98 °F (36.7 °C), Max:98.4 °F (36.9 °C)      Exam:        Patient without distress. Fundus firm, nontender per nursing fundal checks                Perineum with normal lochia noted per nursing assessment                Lower extremities are negative for pathological edema    Labs:     Lab Results   Component Value Date/Time    WBC 16.0 (H) 2023 10:59 PM    WBC 15.6 (H) 2019 03:30 PM    WBC 9.1 2015 10:03 PM    WBC 16.8 (H) 2014 06:25 PM    HGB 11.3 (L) 2023 10:59 PM    HGB 10.3 (L) 2019 03:30 PM    HGB 13.9 2015 10:03 PM    HGB 11.0 (L) 2014 06:25 PM    HCT 36.4 2023 10:59 PM    HCT 32.7 (L) 2019 03:30 PM    HCT 42.0 2015 10:03 PM    HCT 34.8 (L) 2014 06:25 PM    PLATELET 523 (H) 7525 10:59 PM    PLATELET 342 (H)  03:30 PM    PLATELET 153  10:03 PM    PLATELET 292 (H)  06:25 PM       No results found for this or any previous visit (from the past 24 hour(s)).

## 2023-03-10 NOTE — DISCHARGE SUMMARY
Obstetrical Discharge Summary     Name: Orlando Sampson MRN: [id number]  SSN: [id number]    YOB: 1985  Age: 42 y.o. Sex: female      Admit Date: 3/7/2023    Discharge Date: 3/10/2023     Admitting Physician: Rian Murillo MD     Attending Physician:  Katheen Gaucher, MD     Admission Diagnoses: AMA (advanced maternal age) multigravida 33+, third trimester [O09.523]    Discharge Diagnoses:   Information for the patient's :  Erica Minor [[id number]]   Delivery of a 3.305 kg male infant via Vaginal, Spontaneous on 3/8/2023 at 2:54 AM  by Rian Murillo. Apgars were 8  and 9 . Additional Diagnoses:   Hospital Problems  Date Reviewed: 3/7/2023            Codes Class Noted POA    AMA (advanced maternal age) multigravida 33+, third trimester ICD-10-CM: O09.523  ICD-9-CM: 659.63  3/7/2023 Unknown          Lab Results   Component Value Date/Time    Rubella, External 6.14 Immune 2022 12:00 AM    GrBStrep, External Neg 2023 12:00 AM       Hospital Course: Normal hospital course following the delivery. Patient Instructions:   Current Discharge Medication List        START taking these medications    Details   acetaminophen (Acetaminophen Extra Strength) 500 mg tablet Take 2 Tablets by mouth every eight (8) hours as needed for Pain. Qty: 60 Tablet, Refills: 0           CONTINUE these medications which have CHANGED    Details   ibuprofen (ADVIL;MOTRIN) 100 mg/5 mL suspension Take 40 mL by mouth every eight (8) hours as needed (pain) for up to 30 days. Indications: pain  Qty: 3600 mL, Refills: 0           CONTINUE these medications which have NOT CHANGED    Details   PNV Comb #2-Iron-Omega 3-FA [id number] mg cmpk Take  by mouth.      loratadine (Claritin) 10 mg tablet Take 10 mg by mouth.      promethazine (PHENERGAN) 12.5 mg tablet Take  by mouth every six (6) hours as needed for Nausea.            STOP taking these medications       aspirin 81 mg chewable tablet Comments:   Reason for Stopping:         sertraline (ZOLOFT) 25 mg tablet Comments:   Reason for Stopping:               Disposition at Discharge: Home or self care    Condition at Discharge: Stable    Reference my discharge instructions.     Follow-up Appointments   Procedures    FOLLOW UP VISIT Appointment in: 6 Weeks     Standing Status:   Standing     Number of Occurrences:   1     Order Specific Question:   Appointment in     Answer:   6 Weeks        Signed By:  Georgi Mckee MD     March 10, 2023

## 2023-03-10 NOTE — PROGRESS NOTES
Pt without needs at this time. Awaiting discharge instructions     1030 arrived to patients room for discharge and pt found to be crying. She asked me to step out for a moment. 1115 returned to patients room, she states that someone is on the way to get her, I asked the patient if she was safe and she said yes she was. Patient given verbal and written copy of discharge instructions. Signature page signed by patient and responsible party then placed in medical records. Patient given opportunity to ask questions and verbalized understanding of instructions. All questions answered. No distress noted.